# Patient Record
Sex: FEMALE | HISPANIC OR LATINO | Employment: FULL TIME | URBAN - METROPOLITAN AREA
[De-identification: names, ages, dates, MRNs, and addresses within clinical notes are randomized per-mention and may not be internally consistent; named-entity substitution may affect disease eponyms.]

---

## 2017-03-24 LAB
CHOLEST SERPL-MCNC: 203 MG/DL
HDLC SERPL-MCNC: 57 MG/DL (ref 35–60)
LDLC SERPL CALC-MCNC: 125 MG/DL

## 2018-07-05 LAB
BUN SERPL-MCNC: 17 MG/DL (ref 7–18)
CREAT SERPL-MCNC: 0.55 MG/DL (ref 0.6–1.3)
GLUCOSE SERPL-MCNC: 95 MG/DL (ref 74–106)

## 2018-08-01 ENCOUNTER — HISTORICAL (OUTPATIENT)
Dept: LAB | Facility: HOSPITAL | Age: 53
End: 2018-08-01

## 2018-08-01 LAB
ABS NEUT (OLG): 2.48 X10(3)/MCL (ref 2.1–9.2)
ALBUMIN SERPL-MCNC: 3.7 GM/DL (ref 3.4–5)
ALBUMIN/GLOB SERPL: 1.1 {RATIO}
ALP SERPL-CCNC: 75 UNIT/L (ref 38–126)
ALT SERPL-CCNC: 25 UNIT/L (ref 12–78)
APPEARANCE, UA: CLEAR
AST SERPL-CCNC: 21 UNIT/L (ref 15–37)
BACTERIA SPEC CULT: NORMAL /HPF
BASOPHILS # BLD AUTO: 0 X10(3)/MCL (ref 0–0.2)
BASOPHILS NFR BLD AUTO: 0 %
BILIRUB SERPL-MCNC: 0.4 MG/DL (ref 0.2–1)
BILIRUB UR QL STRIP: NEGATIVE
BILIRUBIN DIRECT+TOT PNL SERPL-MCNC: 0.1 MG/DL (ref 0–0.2)
BILIRUBIN DIRECT+TOT PNL SERPL-MCNC: 0.3 MG/DL (ref 0–0.8)
BUN SERPL-MCNC: 12 MG/DL (ref 7–18)
CALCIUM SERPL-MCNC: 8.2 MG/DL (ref 8.5–10.1)
CHLORIDE SERPL-SCNC: 109 MMOL/L (ref 98–107)
CHOLEST SERPL-MCNC: 203 MG/DL (ref 0–200)
CHOLEST/HDLC SERPL: 4.6 {RATIO} (ref 0–4)
CO2 SERPL-SCNC: 28 MMOL/L (ref 21–32)
COLOR UR: YELLOW
CREAT SERPL-MCNC: 0.65 MG/DL (ref 0.55–1.02)
CRP SERPL HS-MCNC: 0.91 MG/L (ref 0–3)
DEPRECATED CALCIDIOL+CALCIFEROL SERPL-MC: 23 NG/ML (ref 30–80)
EOSINOPHIL # BLD AUTO: 0.1 X10(3)/MCL (ref 0–0.9)
EOSINOPHIL NFR BLD AUTO: 2 %
ERYTHROCYTE [DISTWIDTH] IN BLOOD BY AUTOMATED COUNT: 13.3 % (ref 11.5–17)
ERYTHROCYTE [SEDIMENTATION RATE] IN BLOOD: 8 MM/HR (ref 0–20)
GLOBULIN SER-MCNC: 3.3 GM/DL (ref 2.4–3.5)
GLUCOSE (UA): NEGATIVE
GLUCOSE SERPL-MCNC: 88 MG/DL (ref 74–106)
HCT VFR BLD AUTO: 41.5 % (ref 37–47)
HDLC SERPL-MCNC: 44 MG/DL (ref 35–60)
HGB BLD-MCNC: 13.5 GM/DL (ref 12–16)
HGB UR QL STRIP: NEGATIVE
KETONES UR QL STRIP: NEGATIVE
LDLC SERPL CALC-MCNC: 107 MG/DL (ref 0–129)
LEUKOCYTE ESTERASE UR QL STRIP: NEGATIVE
LYMPHOCYTES # BLD AUTO: 2.4 X10(3)/MCL (ref 0.6–4.6)
LYMPHOCYTES NFR BLD AUTO: 45 %
MCH RBC QN AUTO: 28.3 PG (ref 27–31)
MCHC RBC AUTO-ENTMCNC: 32.5 GM/DL (ref 33–36)
MCV RBC AUTO: 87 FL (ref 80–94)
MONOCYTES # BLD AUTO: 0.3 X10(3)/MCL (ref 0.1–1.3)
MONOCYTES NFR BLD AUTO: 6 %
NEUTROPHILS # BLD AUTO: 2.48 X10(3)/MCL (ref 1.4–7.9)
NEUTROPHILS NFR BLD AUTO: 46 %
NITRITE UR QL STRIP: NEGATIVE
PH UR STRIP: 6.5 [PH] (ref 5–9)
PLATELET # BLD AUTO: 259 X10(3)/MCL (ref 130–400)
PMV BLD AUTO: 9 FL (ref 9.4–12.4)
POTASSIUM SERPL-SCNC: 4.1 MMOL/L (ref 3.5–5.1)
PROT SERPL-MCNC: 7 GM/DL (ref 6.4–8.2)
PROT UR QL STRIP: NEGATIVE
RBC # BLD AUTO: 4.77 X10(6)/MCL (ref 4.2–5.4)
RBC #/AREA URNS HPF: NORMAL /[HPF]
RHEUMATOID FACT SERPL-ACNC: <10 IU/ML (ref 0–15)
SODIUM SERPL-SCNC: 143 MMOL/L (ref 136–145)
SP GR UR STRIP: 1.02 (ref 1–1.03)
SQUAMOUS EPITHELIAL, UA: NORMAL
TRIGL SERPL-MCNC: 258 MG/DL (ref 30–150)
TSH SERPL-ACNC: 0.85 MIU/L (ref 0.36–3.74)
UROBILINOGEN UR STRIP-ACNC: 1
VIT B12 SERPL-MCNC: 347 PG/ML (ref 193–986)
VLDLC SERPL CALC-MCNC: 52 MG/DL
WBC # SPEC AUTO: 5.4 X10(3)/MCL (ref 4.5–11.5)
WBC #/AREA URNS HPF: NORMAL /[HPF]

## 2018-12-13 LAB
INFLUENZA A ANTIGEN, POC: NEGATIVE
INFLUENZA B ANTIGEN, POC: NEGATIVE
RAPID GROUP A STREP (OHS): NEGATIVE

## 2019-05-30 ENCOUNTER — HISTORICAL (OUTPATIENT)
Dept: LAB | Facility: HOSPITAL | Age: 54
End: 2019-05-30

## 2019-05-30 LAB
ABS NEUT (OLG): 2.22 X10(3)/MCL (ref 2.1–9.2)
ALBUMIN SERPL-MCNC: 3.5 GM/DL (ref 3.4–5)
ALBUMIN/GLOB SERPL: 1.1 RATIO (ref 1.1–2)
ALP SERPL-CCNC: 76 UNIT/L (ref 38–126)
ALT SERPL-CCNC: 25 UNIT/L (ref 12–78)
AST SERPL-CCNC: 16 UNIT/L (ref 15–37)
BASOPHILS # BLD AUTO: 0 X10(3)/MCL (ref 0–0.2)
BASOPHILS NFR BLD AUTO: 0 %
BILIRUB SERPL-MCNC: 0.3 MG/DL (ref 0.2–1)
BILIRUBIN DIRECT+TOT PNL SERPL-MCNC: 0.1 MG/DL (ref 0–0.5)
BILIRUBIN DIRECT+TOT PNL SERPL-MCNC: 0.2 MG/DL (ref 0–0.8)
BUN SERPL-MCNC: 12 MG/DL (ref 7–18)
CALCIUM SERPL-MCNC: 9 MG/DL (ref 8.5–10.1)
CHLORIDE SERPL-SCNC: 111 MMOL/L (ref 98–107)
CHOLEST SERPL-MCNC: 202 MG/DL (ref 0–200)
CHOLEST/HDLC SERPL: 3.5 {RATIO} (ref 0–4)
CO2 SERPL-SCNC: 28 MMOL/L (ref 21–32)
CREAT SERPL-MCNC: 0.63 MG/DL (ref 0.55–1.02)
DEPRECATED CALCIDIOL+CALCIFEROL SERPL-MC: 39.34 NG/ML (ref 30–80)
EOSINOPHIL # BLD AUTO: 0.1 X10(3)/MCL (ref 0–0.9)
EOSINOPHIL NFR BLD AUTO: 2 %
ERYTHROCYTE [DISTWIDTH] IN BLOOD BY AUTOMATED COUNT: 13.2 % (ref 11.5–17)
GLOBULIN SER-MCNC: 3.3 GM/DL (ref 2.4–3.5)
GLUCOSE SERPL-MCNC: 89 MG/DL (ref 74–106)
HCT VFR BLD AUTO: 41.1 % (ref 37–47)
HDLC SERPL-MCNC: 58 MG/DL (ref 35–60)
HGB BLD-MCNC: 13 GM/DL (ref 12–16)
LDLC SERPL CALC-MCNC: 123 MG/DL (ref 0–129)
LYMPHOCYTES # BLD AUTO: 2.2 X10(3)/MCL (ref 0.6–4.6)
LYMPHOCYTES NFR BLD AUTO: 47 %
MCH RBC QN AUTO: 27.7 PG (ref 27–31)
MCHC RBC AUTO-ENTMCNC: 31.6 GM/DL (ref 33–36)
MCV RBC AUTO: 87.6 FL (ref 80–94)
MONOCYTES # BLD AUTO: 0.2 X10(3)/MCL (ref 0.1–1.3)
MONOCYTES NFR BLD AUTO: 4 %
NEUTROPHILS # BLD AUTO: 2.22 X10(3)/MCL (ref 2.1–9.2)
NEUTROPHILS NFR BLD AUTO: 46 %
PLATELET # BLD AUTO: 244 X10(3)/MCL (ref 130–400)
PMV BLD AUTO: 9.4 FL (ref 9.4–12.4)
POTASSIUM SERPL-SCNC: 4.7 MMOL/L (ref 3.5–5.1)
PROT SERPL-MCNC: 6.8 GM/DL (ref 6.4–8.2)
RBC # BLD AUTO: 4.69 X10(6)/MCL (ref 4.2–5.4)
SODIUM SERPL-SCNC: 145 MMOL/L (ref 136–145)
TRIGL SERPL-MCNC: 103 MG/DL (ref 30–150)
TSH SERPL-ACNC: 1.08 MIU/L (ref 0.36–3.74)
VLDLC SERPL CALC-MCNC: 21 MG/DL
WBC # SPEC AUTO: 4.8 X10(3)/MCL (ref 4.5–11.5)

## 2020-02-04 ENCOUNTER — HISTORICAL (OUTPATIENT)
Dept: LAB | Facility: HOSPITAL | Age: 55
End: 2020-02-04

## 2020-02-04 LAB
ABS NEUT (OLG): 2.73 X10(3)/MCL (ref 2.1–9.2)
ALBUMIN SERPL-MCNC: 3.6 GM/DL (ref 3.4–5)
ALBUMIN/GLOB SERPL: 1.1 RATIO (ref 1.1–2)
ALP SERPL-CCNC: 63 UNIT/L (ref 38–126)
ALT SERPL-CCNC: 28 UNIT/L (ref 12–78)
AST SERPL-CCNC: 12 UNIT/L (ref 15–37)
BASOPHILS # BLD AUTO: 0 X10(3)/MCL (ref 0–0.2)
BASOPHILS NFR BLD AUTO: 0 %
BILIRUB SERPL-MCNC: 0.4 MG/DL (ref 0.2–1)
BILIRUBIN DIRECT+TOT PNL SERPL-MCNC: 0.1 MG/DL (ref 0–0.5)
BILIRUBIN DIRECT+TOT PNL SERPL-MCNC: 0.3 MG/DL (ref 0–0.8)
BUN SERPL-MCNC: 16 MG/DL (ref 7–18)
CALCIUM SERPL-MCNC: 8.3 MG/DL (ref 8.5–10.1)
CHLORIDE SERPL-SCNC: 110 MMOL/L (ref 98–107)
CHOLEST SERPL-MCNC: 198 MG/DL (ref 0–200)
CHOLEST/HDLC SERPL: 4 {RATIO} (ref 0–4)
CO2 SERPL-SCNC: 28 MMOL/L (ref 21–32)
CREAT SERPL-MCNC: 0.62 MG/DL (ref 0.55–1.02)
EOSINOPHIL # BLD AUTO: 0.1 X10(3)/MCL (ref 0–0.9)
EOSINOPHIL NFR BLD AUTO: 2 %
ERYTHROCYTE [DISTWIDTH] IN BLOOD BY AUTOMATED COUNT: 14.1 % (ref 11.5–17)
GLOBULIN SER-MCNC: 3.2 GM/DL (ref 2.4–3.5)
GLUCOSE SERPL-MCNC: 96 MG/DL (ref 74–106)
HCT VFR BLD AUTO: 44.2 % (ref 37–47)
HDLC SERPL-MCNC: 50 MG/DL (ref 35–60)
HGB BLD-MCNC: 13.5 GM/DL (ref 12–16)
LDLC SERPL CALC-MCNC: 127 MG/DL (ref 0–129)
LYMPHOCYTES # BLD AUTO: 2 X10(3)/MCL (ref 0.6–4.6)
LYMPHOCYTES NFR BLD AUTO: 39 %
MCH RBC QN AUTO: 26.7 PG (ref 27–31)
MCHC RBC AUTO-ENTMCNC: 30.5 GM/DL (ref 33–36)
MCV RBC AUTO: 87.5 FL (ref 80–94)
MONOCYTES # BLD AUTO: 0.2 X10(3)/MCL (ref 0.1–1.3)
MONOCYTES NFR BLD AUTO: 4 %
NEUTROPHILS # BLD AUTO: 2.73 X10(3)/MCL (ref 2.1–9.2)
NEUTROPHILS NFR BLD AUTO: 54 %
PLATELET # BLD AUTO: 248 X10(3)/MCL (ref 130–400)
PMV BLD AUTO: 9 FL (ref 9.4–12.4)
POTASSIUM SERPL-SCNC: 4.6 MMOL/L (ref 3.5–5.1)
PROT SERPL-MCNC: 6.8 GM/DL (ref 6.4–8.2)
RBC # BLD AUTO: 5.05 X10(6)/MCL (ref 4.2–5.4)
SODIUM SERPL-SCNC: 145 MMOL/L (ref 136–145)
TRIGL SERPL-MCNC: 103 MG/DL (ref 30–150)
TSH SERPL-ACNC: 0.72 MIU/L (ref 0.36–3.74)
VLDLC SERPL CALC-MCNC: 21 MG/DL
WBC # SPEC AUTO: 5 X10(3)/MCL (ref 4.5–11.5)

## 2022-04-09 ENCOUNTER — HISTORICAL (OUTPATIENT)
Dept: ADMINISTRATIVE | Facility: HOSPITAL | Age: 57
End: 2022-04-09

## 2022-04-27 VITALS
DIASTOLIC BLOOD PRESSURE: 79 MMHG | BODY MASS INDEX: 32.99 KG/M2 | HEIGHT: 65 IN | WEIGHT: 198 LBS | OXYGEN SATURATION: 97 % | SYSTOLIC BLOOD PRESSURE: 128 MMHG

## 2022-09-16 ENCOUNTER — HISTORICAL (OUTPATIENT)
Dept: ADMINISTRATIVE | Facility: HOSPITAL | Age: 57
End: 2022-09-16

## 2023-04-29 ENCOUNTER — OFFICE VISIT (OUTPATIENT)
Dept: URGENT CARE | Facility: CLINIC | Age: 58
End: 2023-04-29
Payer: COMMERCIAL

## 2023-04-29 VITALS
HEIGHT: 64 IN | TEMPERATURE: 99 F | SYSTOLIC BLOOD PRESSURE: 132 MMHG | RESPIRATION RATE: 16 BRPM | HEART RATE: 83 BPM | BODY MASS INDEX: 30.73 KG/M2 | WEIGHT: 180 LBS | DIASTOLIC BLOOD PRESSURE: 85 MMHG | OXYGEN SATURATION: 98 %

## 2023-04-29 DIAGNOSIS — R09.82 PND (POST-NASAL DRIP): ICD-10-CM

## 2023-04-29 DIAGNOSIS — J06.9 UPPER RESPIRATORY TRACT INFECTION, UNSPECIFIED TYPE: Primary | ICD-10-CM

## 2023-04-29 DIAGNOSIS — R05.9 COUGH, UNSPECIFIED TYPE: ICD-10-CM

## 2023-04-29 PROCEDURE — 96372 THER/PROPH/DIAG INJ SC/IM: CPT | Mod: ,,,

## 2023-04-29 PROCEDURE — 96372 PR INJECTION,THERAP/PROPH/DIAG2ST, IM OR SUBCUT: ICD-10-PCS | Mod: ,,,

## 2023-04-29 PROCEDURE — 99203 PR OFFICE/OUTPT VISIT, NEW, LEVL III, 30-44 MIN: ICD-10-PCS | Mod: 25,,,

## 2023-04-29 PROCEDURE — 99203 OFFICE O/P NEW LOW 30 MIN: CPT | Mod: 25,,,

## 2023-04-29 RX ORDER — LISINOPRIL 10 MG/1
10 TABLET ORAL
COMMUNITY
Start: 2023-04-24

## 2023-04-29 RX ORDER — PROGESTERONE 100 MG/1
CAPSULE ORAL
COMMUNITY

## 2023-04-29 RX ORDER — QUETIAPINE FUMARATE 25 MG/1
TABLET, FILM COATED ORAL
COMMUNITY

## 2023-04-29 RX ORDER — PROMETHAZINE HYDROCHLORIDE AND DEXTROMETHORPHAN HYDROBROMIDE 6.25; 15 MG/5ML; MG/5ML
5 SYRUP ORAL EVERY 6 HOURS PRN
Qty: 118 ML | Refills: 0 | Status: SHIPPED | OUTPATIENT
Start: 2023-04-29 | End: 2023-05-09

## 2023-04-29 RX ORDER — ERGOCALCIFEROL 1.25 MG/1
CAPSULE ORAL
COMMUNITY

## 2023-04-29 RX ORDER — DEXAMETHASONE SODIUM PHOSPHATE 100 MG/10ML
10 INJECTION INTRAMUSCULAR; INTRAVENOUS
Status: COMPLETED | OUTPATIENT
Start: 2023-04-29 | End: 2023-04-29

## 2023-04-29 RX ORDER — LISDEXAMFETAMINE DIMESYLATE 20 MG/1
20 CAPSULE ORAL
COMMUNITY
Start: 2023-03-30

## 2023-04-29 RX ORDER — ESTRADIOL 0.1 MG/D
1 FILM, EXTENDED RELEASE TRANSDERMAL
COMMUNITY

## 2023-04-29 RX ADMIN — DEXAMETHASONE SODIUM PHOSPHATE 10 MG: 100 INJECTION INTRAMUSCULAR; INTRAVENOUS at 02:04

## 2023-04-29 NOTE — PATIENT INSTRUCTIONS
Discussed possible viral URI, common cold etiology versus seasonal allergies, allergic rhinitis     Drink plenty of fluids. Get plenty of rest.   Claritin or Zyrtec 10 mg for nasal congestion.  Nasal spray such as Nasacort or Flonase for congestion.  Over-the-counter Coricidin if you develop sinus pressure.  Prescription Cough syrup nightly as it may make you drowsy.  Over-the-counter cough and cold medication for daytime cough.  Warm saltwater gargles for sore throat.  Warm water with honey to help coat the throat.  Throat lozenges.  Tylenol or ibuprofen as needed for sore throat and fever.   Chloraseptic Spray for worsening sore throat  Call or return to clinic as needed   Go to the ER with any significant change or worsening of symptoms.   Follow up with your primary care doctor.

## 2023-04-29 NOTE — PROGRESS NOTES
"Subjective:      Patient ID: Carly Macario is a 57 y.o. female.    Vitals:  height is 5' 4" (1.626 m) and weight is 81.6 kg (180 lb). Her temperature is 98.5 °F (36.9 °C). Her blood pressure is 132/85 and her pulse is 83. Her respiration is 16 and oxygen saturation is 98%.     Chief Complaint: Sinus Problem     Patient is a 57 y.o. female with past medical history of hypertension who presents to urgent care with complaints of nasal congestion, cough, and  post nasal drip which began yesterday.  No relief with tylenol and allergy medicines.  Patient denies fever, neck stiffness, rash, GI symptoms, shortness breath, chest pain.  Patient denies sinus pressure.  Discussed possibility of swabbing for COVID however patient reports she is not feel as if she has had COVID, she is had it before and symptoms were not similar.  Patient declines testing.  ROS   Objective:     Physical Exam   Constitutional: She is oriented to person, place, and time. She appears well-developed. She is cooperative.  Non-toxic appearance. She does not appear ill. No distress.   HENT:   Head: Normocephalic and atraumatic.   Ears:   Right Ear: Hearing, tympanic membrane, external ear and ear canal normal.   Left Ear: Hearing, tympanic membrane, external ear and ear canal normal.   Nose: Nose normal. No mucosal edema, rhinorrhea or nasal deformity. No epistaxis. Right sinus exhibits no maxillary sinus tenderness and no frontal sinus tenderness. Left sinus exhibits no maxillary sinus tenderness and no frontal sinus tenderness.   Mouth/Throat: Uvula is midline, oropharynx is clear and moist and mucous membranes are normal. No trismus in the jaw. Normal dentition. No uvula swelling. Cobblestoning present. No oropharyngeal exudate, posterior oropharyngeal edema or posterior oropharyngeal erythema.   Eyes: Conjunctivae and lids are normal. No scleral icterus.   Neck: Trachea normal and phonation normal. Neck supple. No edema present. No erythema present. " No neck rigidity present.   Cardiovascular: Normal rate, regular rhythm, normal heart sounds and normal pulses.   Pulmonary/Chest: Effort normal and breath sounds normal. No respiratory distress. She has no decreased breath sounds. She has no rhonchi.   Abdominal: Normal appearance.   Musculoskeletal: Normal range of motion.         General: No deformity. Normal range of motion.   Lymphadenopathy:     She has no cervical adenopathy.   Neurological: She is alert and oriented to person, place, and time. She exhibits normal muscle tone. Coordination normal.   Skin: Skin is warm, dry, intact, not diaphoretic and not pale.   Psychiatric: Her speech is normal and behavior is normal. Judgment and thought content normal.   Nursing note and vitals reviewed.    Assessment:     1. Upper respiratory tract infection, unspecified type    2. PND (post-nasal drip)    3. Cough, unspecified type        Plan:       Upper respiratory tract infection, unspecified type    PND (post-nasal drip)  -     dexAMETHasone injection 10 mg    Cough, unspecified type  -     promethazine-dextromethorphan (PROMETHAZINE-DM) 6.25-15 mg/5 mL Syrp; Take 5 mLs by mouth every 6 (six) hours as needed (cough).  Dispense: 118 mL; Refill: 0        Discussed possibility of steroid injection, informed patient that if symptoms could be related to COVID-19, steroid injection may lengthen symptoms, patient is requesting steroid injection and declining COVID testing.     Discussed possible viral URI, common cold etiology versus seasonal allergies, allergic rhinitis     Drink plenty of fluids. Get plenty of rest.   Claritin or Zyrtec 10 mg for nasal congestion.  Nasal spray such as Nasacort or Flonase for congestion.  Over-the-counter Coricidin if you develop sinus pressure.  Prescription Cough syrup nightly as it may make you drowsy.  Over-the-counter cough and cold medication for daytime cough.  Warm saltwater gargles for sore throat.  Warm water with honey to help  coat the throat.  Throat lozenges.  Tylenol or ibuprofen as needed for sore throat and fever.   Chloraseptic Spray for worsening sore throat  Call or return to clinic as needed   Go to the ER with any significant change or worsening of symptoms.   Follow up with your primary care doctor.

## 2023-07-28 ENCOUNTER — OFFICE VISIT (OUTPATIENT)
Dept: URGENT CARE | Facility: CLINIC | Age: 58
End: 2023-07-28
Payer: COMMERCIAL

## 2023-07-28 VITALS
HEART RATE: 93 BPM | DIASTOLIC BLOOD PRESSURE: 79 MMHG | HEIGHT: 64 IN | SYSTOLIC BLOOD PRESSURE: 117 MMHG | BODY MASS INDEX: 30.73 KG/M2 | TEMPERATURE: 98 F | OXYGEN SATURATION: 97 % | WEIGHT: 180 LBS | RESPIRATION RATE: 18 BRPM

## 2023-07-28 DIAGNOSIS — J32.9 SINUSITIS, UNSPECIFIED CHRONICITY, UNSPECIFIED LOCATION: Primary | ICD-10-CM

## 2023-07-28 PROCEDURE — 99213 OFFICE O/P EST LOW 20 MIN: CPT | Mod: S$PBB,25,, | Performed by: FAMILY MEDICINE

## 2023-07-28 PROCEDURE — 96372 PR INJECTION,THERAP/PROPH/DIAG2ST, IM OR SUBCUT: ICD-10-PCS | Mod: S$PBB,,, | Performed by: FAMILY MEDICINE

## 2023-07-28 PROCEDURE — 99213 PR OFFICE/OUTPT VISIT, EST, LEVL III, 20-29 MIN: ICD-10-PCS | Mod: S$PBB,25,, | Performed by: FAMILY MEDICINE

## 2023-07-28 PROCEDURE — 96372 THER/PROPH/DIAG INJ SC/IM: CPT | Mod: S$PBB,,, | Performed by: FAMILY MEDICINE

## 2023-07-28 RX ORDER — PREDNISONE 20 MG/1
40 TABLET ORAL DAILY
Qty: 8 TABLET | Refills: 0 | Status: SHIPPED | OUTPATIENT
Start: 2023-07-28 | End: 2023-08-01

## 2023-07-28 RX ORDER — DEXAMETHASONE SODIUM PHOSPHATE 100 MG/10ML
10 INJECTION INTRAMUSCULAR; INTRAVENOUS
Status: COMPLETED | OUTPATIENT
Start: 2023-07-28 | End: 2023-07-28

## 2023-07-28 RX ADMIN — DEXAMETHASONE SODIUM PHOSPHATE 10 MG: 100 INJECTION INTRAMUSCULAR; INTRAVENOUS at 09:07

## 2023-07-28 NOTE — PROGRESS NOTES
"Subjective:      Patient ID: Carly Macario is a 58 y.o. female.    Vitals:  height is 5' 4" (1.626 m) and weight is 81.6 kg (180 lb). Her temperature is 98.4 °F (36.9 °C). Her blood pressure is 117/79 and her pulse is 93. Her respiration is 18 and oxygen saturation is 97%.     Chief Complaint: Sinus Problem    58 y.o. female presents to clinic w/ c/o congestion, post nasal drip, bilateral ear fullness x2d. Declined covid and flu testing.  No over-the-counter medication taken.. Denies fever, neck stiffness, cough, sore throat, drainage from ears, ringing/popping, wheezing, SOB, CP, N/V/D, abdominal pain, sinus pressure, sinus headache.    Constitution: Negative.   HENT:  Positive for congestion and postnasal drip.    Cardiovascular: Negative.    Eyes: Negative.    Respiratory: Negative.     Gastrointestinal: Negative.    Genitourinary: Negative.    Musculoskeletal: Negative.    Skin: Negative.    Allergic/Immunologic: Negative.    Neurological: Negative.    Hematologic/Lymphatic: Negative.     Objective:     Physical Exam   Constitutional: She is oriented to person, place, and time. She appears well-developed. She is cooperative.  Non-toxic appearance. She does not appear ill. No distress.   HENT:   Head: Normocephalic and atraumatic.   Ears:   Right Ear: Hearing and external ear normal.   Left Ear: Hearing and external ear normal.   Mouth/Throat: Oropharynx is clear and moist and mucous membranes are normal. No oropharyngeal exudate or posterior oropharyngeal erythema (postnasal drip.  No sinus tenderness on palpation.).   Eyes: Conjunctivae and lids are normal.   Neck: Trachea normal and phonation normal. Neck supple. No edema present. No erythema present. No neck rigidity present.   Cardiovascular: Normal rate.   Pulmonary/Chest: Effort normal and breath sounds normal. No stridor. No respiratory distress. She has no decreased breath sounds. She has no wheezes. She has no rhonchi. She has no rales.   Abdominal: " "Normal appearance.   Lymphadenopathy:     She has no cervical adenopathy.   Neurological: She is alert and oriented to person, place, and time. She exhibits normal muscle tone. Coordination normal.   Skin: Skin is warm, dry, intact, not diaphoretic and no rash.   Psychiatric: Her speech is normal and behavior is normal. Mood, judgment and thought content normal.   Nursing note and vitals reviewed.       Previous History      Review of patient's allergies indicates:  No Known Allergies    Past Medical History:   Diagnosis Date    Anxiety     Hypertension      Current Outpatient Medications   Medication Instructions    ergocalciferol (ERGOCALCIFEROL) 50,000 unit Cap ergocalciferol (vitamin D2) 1,250 mcg (50,000 unit) capsule    estradioL (VIVELLE-DOT) 0.1 mg/24 hr PTSW 1 patch, Transdermal    lisinopriL 10 mg, Oral    predniSONE (DELTASONE) 40 mg, Oral, Daily    progesterone (PROMETRIUM) 100 MG capsule progesterone micronized 100 mg capsule   TAKE 1 CAPSULE BY MOUTH AT BEDTIME    QUEtiapine (SEROQUEL) 25 MG Tab quetiapine 25 mg tablet    VYVANSE 20 mg, Oral     History reviewed. No pertinent surgical history.  Family History   Family history unknown: Yes       Social History     Tobacco Use    Smoking status: Never    Smokeless tobacco: Never   Substance Use Topics    Alcohol use: Yes     Comment: 2 per mth        Physical Exam      Vital Signs Reviewed   /79   Pulse 93   Temp 98.4 °F (36.9 °C)   Resp 18   Ht 5' 4" (1.626 m)   Wt 81.6 kg (180 lb)   SpO2 97%   BMI 30.90 kg/m²        Procedures    Procedures     Labs     Results for orders placed or performed in visit on 09/16/22   BUN   Result Value Ref Range    Blood Urea Nitrogen 17 7 - 18 mg/dL   Creatinine, Serum   Result Value Ref Range    Creatinine 0.55 (L) 0.6 - 1.3 mg/dL   Glucose, Random   Result Value Ref Range    Glucose Level 95 74 - 106 mg/dL   HDL Cholesterol   Result Value Ref Range    HDL Cholesterol 57 35 - 60 mg/dL   LDL Cholesterol, " Direct   Result Value Ref Range    LDL Cholesterol 125 <130 mg/dL   Cholesterol, Total   Result Value Ref Range    Cholesterol Total 203 (H) <200 mg/dL       Assessment:     1. Sinusitis, unspecified chronicity, unspecified location        Plan:   Medications sent to pharmacy  Start steroids tomorrow morning  Start taking an allergy pill daily such as claritin, zyrtec, allegrea or xyzal. Also start using a nasal steroid spray such as flonase or nasacort daily.If oral steroids were prescribed, start them tomorrow morning. Monitor for fever. Take tylenol/acetaminophen or ibuprofen as needed. Rest and hydrate. If symptoms persist or worsen, return to clinic or seek medical attention immediately.       Sinusitis, unspecified chronicity, unspecified location    Other orders  -     dexAMETHasone injection 10 mg  -     predniSONE (DELTASONE) 20 MG tablet; Take 2 tablets (40 mg total) by mouth once daily. for 4 days  Dispense: 8 tablet; Refill: 0

## 2023-07-28 NOTE — PATIENT INSTRUCTIONS
Medications sent to pharmacy  Start steroids tomorrow morning  Start taking an allergy pill daily such as claritin, zyrtec, allegrea or xyzal. Also start using a nasal steroid spray such as flonase or nasacort daily.If oral steroids were prescribed, start them tomorrow morning. Monitor for fever. Take tylenol/acetaminophen or ibuprofen as needed. Rest and hydrate. If symptoms persist or worsen, return to clinic or seek medical attention immediately.

## 2024-09-18 PROBLEM — E66.9 OBESITY: Status: ACTIVE | Noted: 2024-09-18

## 2024-09-18 PROBLEM — Z79.890 HORMONE REPLACEMENT THERAPY: Status: ACTIVE | Noted: 2024-09-18

## 2024-09-18 PROBLEM — F90.9 ATTENTION DEFICIT DISORDER OF ADULT WITH HYPERACTIVITY: Status: ACTIVE | Noted: 2024-09-18

## 2024-09-18 PROBLEM — M19.90 OSTEOARTHRITIS: Status: ACTIVE | Noted: 2024-09-18

## 2024-09-18 PROBLEM — I10 HYPERTENSION: Status: ACTIVE | Noted: 2024-09-18

## 2024-09-19 ENCOUNTER — OFFICE VISIT (OUTPATIENT)
Dept: PRIMARY CARE CLINIC | Facility: CLINIC | Age: 59
End: 2024-09-19
Payer: COMMERCIAL

## 2024-09-19 VITALS
TEMPERATURE: 98 F | WEIGHT: 193 LBS | HEIGHT: 65 IN | HEART RATE: 89 BPM | OXYGEN SATURATION: 99 % | DIASTOLIC BLOOD PRESSURE: 91 MMHG | SYSTOLIC BLOOD PRESSURE: 138 MMHG | RESPIRATION RATE: 18 BRPM | BODY MASS INDEX: 32.15 KG/M2

## 2024-09-19 DIAGNOSIS — Z11.4 ENCOUNTER FOR SCREENING FOR HIV: ICD-10-CM

## 2024-09-19 DIAGNOSIS — Z00.00 WELLNESS EXAMINATION: ICD-10-CM

## 2024-09-19 DIAGNOSIS — F41.1 GAD (GENERALIZED ANXIETY DISORDER): ICD-10-CM

## 2024-09-19 DIAGNOSIS — Z12.4 CERVICAL CANCER SCREENING: ICD-10-CM

## 2024-09-19 DIAGNOSIS — I10 PRIMARY HYPERTENSION: ICD-10-CM

## 2024-09-19 DIAGNOSIS — Z11.59 ENCOUNTER FOR HEPATITIS C SCREENING TEST FOR LOW RISK PATIENT: ICD-10-CM

## 2024-09-19 DIAGNOSIS — Z79.890 HORMONE REPLACEMENT THERAPY: ICD-10-CM

## 2024-09-19 DIAGNOSIS — Z76.89 ENCOUNTER TO ESTABLISH CARE WITH NEW DOCTOR: Primary | ICD-10-CM

## 2024-09-19 DIAGNOSIS — G47.00 INSOMNIA, UNSPECIFIED TYPE: ICD-10-CM

## 2024-09-19 DIAGNOSIS — Z12.11 COLON CANCER SCREENING: ICD-10-CM

## 2024-09-19 DIAGNOSIS — E53.8 VITAMIN B12 DEFICIENCY: ICD-10-CM

## 2024-09-19 DIAGNOSIS — F90.9 ATTENTION DEFICIT DISORDER OF ADULT WITH HYPERACTIVITY: ICD-10-CM

## 2024-09-19 DIAGNOSIS — E55.9 VITAMIN D DEFICIENCY: ICD-10-CM

## 2024-09-19 DIAGNOSIS — Z12.31 ENCOUNTER FOR SCREENING MAMMOGRAM FOR BREAST CANCER: ICD-10-CM

## 2024-09-19 RX ORDER — ESTRADIOL 0.05 MG/D
1 PATCH, EXTENDED RELEASE TRANSDERMAL
COMMUNITY
Start: 2024-08-06 | End: 2024-09-19 | Stop reason: SDUPTHER

## 2024-09-19 RX ORDER — DEXTROAMPHETAMINE SACCHARATE, AMPHETAMINE ASPARTATE MONOHYDRATE, DEXTROAMPHETAMINE SULFATE AND AMPHETAMINE SULFATE 7.5; 7.5; 7.5; 7.5 MG/1; MG/1; MG/1; MG/1
CAPSULE, EXTENDED RELEASE ORAL
COMMUNITY
Start: 2024-07-06 | End: 2024-09-19

## 2024-09-19 RX ORDER — ESTRADIOL 0.05 MG/D
1 PATCH, EXTENDED RELEASE TRANSDERMAL
Qty: 8 PATCH | Refills: 3 | Status: SHIPPED | OUTPATIENT
Start: 2024-09-19

## 2024-09-19 RX ORDER — FLUOXETINE HYDROCHLORIDE 20 MG/1
20 CAPSULE ORAL DAILY
Qty: 30 CAPSULE | Refills: 11 | Status: SHIPPED | OUTPATIENT
Start: 2024-09-19 | End: 2025-09-19

## 2024-09-19 RX ORDER — QUETIAPINE FUMARATE 25 MG/1
25 TABLET, FILM COATED ORAL NIGHTLY
Qty: 30 TABLET | Refills: 11 | Status: SHIPPED | OUTPATIENT
Start: 2024-09-19 | End: 2025-09-19

## 2024-09-19 RX ORDER — DEXMETHYLPHENIDATE HYDROCHLORIDE 30 MG/1
30 CAPSULE, EXTENDED RELEASE ORAL DAILY
Qty: 30 CAPSULE | Refills: 0 | Status: SHIPPED | OUTPATIENT
Start: 2024-09-19

## 2024-09-20 ENCOUNTER — LAB VISIT (OUTPATIENT)
Dept: LAB | Facility: HOSPITAL | Age: 59
End: 2024-09-20
Attending: STUDENT IN AN ORGANIZED HEALTH CARE EDUCATION/TRAINING PROGRAM
Payer: COMMERCIAL

## 2024-09-20 DIAGNOSIS — F90.9 ATTENTION DEFICIT DISORDER OF ADULT WITH HYPERACTIVITY: ICD-10-CM

## 2024-09-20 DIAGNOSIS — Z11.59 ENCOUNTER FOR HEPATITIS C SCREENING TEST FOR LOW RISK PATIENT: ICD-10-CM

## 2024-09-20 DIAGNOSIS — Z00.00 WELLNESS EXAMINATION: ICD-10-CM

## 2024-09-20 DIAGNOSIS — E55.9 VITAMIN D DEFICIENCY: ICD-10-CM

## 2024-09-20 DIAGNOSIS — Z76.89 ENCOUNTER TO ESTABLISH CARE WITH NEW DOCTOR: ICD-10-CM

## 2024-09-20 DIAGNOSIS — Z11.4 ENCOUNTER FOR SCREENING FOR HIV: ICD-10-CM

## 2024-09-20 DIAGNOSIS — E53.8 VITAMIN B12 DEFICIENCY: ICD-10-CM

## 2024-09-20 DIAGNOSIS — I10 PRIMARY HYPERTENSION: ICD-10-CM

## 2024-09-20 DIAGNOSIS — G47.00 INSOMNIA, UNSPECIFIED TYPE: ICD-10-CM

## 2024-09-20 DIAGNOSIS — F41.1 GAD (GENERALIZED ANXIETY DISORDER): ICD-10-CM

## 2024-09-20 DIAGNOSIS — Z79.890 HORMONE REPLACEMENT THERAPY: ICD-10-CM

## 2024-09-20 LAB
25(OH)D3+25(OH)D2 SERPL-MCNC: 21 NG/ML (ref 30–80)
ALBUMIN SERPL-MCNC: 3.7 G/DL (ref 3.5–5)
ALBUMIN/GLOB SERPL: 1.4 RATIO (ref 1.1–2)
ALP SERPL-CCNC: 63 UNIT/L (ref 40–150)
ALT SERPL-CCNC: 18 UNIT/L (ref 0–55)
ANION GAP SERPL CALC-SCNC: 6 MEQ/L
AST SERPL-CCNC: 15 UNIT/L (ref 5–34)
BACTERIA #/AREA URNS AUTO: ABNORMAL /HPF
BASOPHILS # BLD AUTO: 0.01 X10(3)/MCL
BASOPHILS NFR BLD AUTO: 0.2 %
BILIRUB SERPL-MCNC: 0.5 MG/DL
BILIRUB UR QL STRIP.AUTO: NEGATIVE
BUN SERPL-MCNC: 13.1 MG/DL (ref 9.8–20.1)
CALCIUM SERPL-MCNC: 9.6 MG/DL (ref 8.4–10.2)
CHLORIDE SERPL-SCNC: 108 MMOL/L (ref 98–107)
CHOLEST SERPL-MCNC: 180 MG/DL
CHOLEST/HDLC SERPL: 4 {RATIO} (ref 0–5)
CLARITY UR: CLEAR
CO2 SERPL-SCNC: 27 MMOL/L (ref 22–29)
COLOR UR AUTO: ABNORMAL
CREAT SERPL-MCNC: 0.78 MG/DL (ref 0.55–1.02)
CREAT/UREA NIT SERPL: 17
EOSINOPHIL # BLD AUTO: 0.06 X10(3)/MCL (ref 0–0.9)
EOSINOPHIL NFR BLD AUTO: 1.3 %
ERYTHROCYTE [DISTWIDTH] IN BLOOD BY AUTOMATED COUNT: 14.1 % (ref 11.5–17)
EST. AVERAGE GLUCOSE BLD GHB EST-MCNC: 108.3 MG/DL
ESTRADIOL SERPL HS-MCNC: 28 PG/ML
FSH SERPL-ACNC: 28.77 MIU/ML
GFR SERPLBLD CREATININE-BSD FMLA CKD-EPI: >60 ML/MIN/1.73/M2
GLOBULIN SER-MCNC: 2.6 GM/DL (ref 2.4–3.5)
GLUCOSE SERPL-MCNC: 97 MG/DL (ref 74–100)
GLUCOSE UR QL STRIP: NORMAL
HBA1C MFR BLD: 5.4 %
HCT VFR BLD AUTO: 42.4 % (ref 37–47)
HDLC SERPL-MCNC: 43 MG/DL (ref 35–60)
HGB BLD-MCNC: 13.6 G/DL (ref 12–16)
HGB UR QL STRIP: NEGATIVE
IMM GRANULOCYTES # BLD AUTO: 0.02 X10(3)/MCL (ref 0–0.04)
IMM GRANULOCYTES NFR BLD AUTO: 0.4 %
KETONES UR QL STRIP: NEGATIVE
LDLC SERPL CALC-MCNC: 106 MG/DL (ref 50–140)
LEUKOCYTE ESTERASE UR QL STRIP: NEGATIVE
LH SERPL-ACNC: 14.69 MIU/ML
LYMPHOCYTES # BLD AUTO: 1.87 X10(3)/MCL (ref 0.6–4.6)
LYMPHOCYTES NFR BLD AUTO: 40.4 %
MCH RBC QN AUTO: 27.9 PG (ref 27–31)
MCHC RBC AUTO-ENTMCNC: 32.1 G/DL (ref 33–36)
MCV RBC AUTO: 87.1 FL (ref 80–94)
MONOCYTES # BLD AUTO: 0.29 X10(3)/MCL (ref 0.1–1.3)
MONOCYTES NFR BLD AUTO: 6.3 %
MUCOUS THREADS URNS QL MICRO: ABNORMAL /LPF
NEUTROPHILS # BLD AUTO: 2.38 X10(3)/MCL (ref 2.1–9.2)
NEUTROPHILS NFR BLD AUTO: 51.4 %
NITRITE UR QL STRIP: NEGATIVE
NRBC BLD AUTO-RTO: 0 %
PH UR STRIP: 7 [PH]
PLATELET # BLD AUTO: 265 X10(3)/MCL (ref 130–400)
PMV BLD AUTO: 9 FL (ref 7.4–10.4)
POTASSIUM SERPL-SCNC: 5 MMOL/L (ref 3.5–5.1)
PROGEST SERPL-MCNC: <0.5 NG/ML
PROT SERPL-MCNC: 6.3 GM/DL (ref 6.4–8.3)
PROT UR QL STRIP: NEGATIVE
RBC # BLD AUTO: 4.87 X10(6)/MCL (ref 4.2–5.4)
RBC #/AREA URNS AUTO: ABNORMAL /HPF
SODIUM SERPL-SCNC: 141 MMOL/L (ref 136–145)
SP GR UR STRIP.AUTO: 1.02 (ref 1–1.03)
SQUAMOUS #/AREA URNS LPF: ABNORMAL /HPF
TESTOST SERPL-MCNC: 35.26 NG/DL (ref 12.4–35.75)
TRIGL SERPL-MCNC: 156 MG/DL (ref 37–140)
TSH SERPL-ACNC: 0.79 UIU/ML (ref 0.35–4.94)
UROBILINOGEN UR STRIP-ACNC: NORMAL
VIT B12 SERPL-MCNC: 1099 PG/ML (ref 213–816)
VLDLC SERPL CALC-MCNC: 31 MG/DL
WBC # BLD AUTO: 4.63 X10(3)/MCL (ref 4.5–11.5)
WBC #/AREA URNS AUTO: ABNORMAL /HPF

## 2024-09-20 PROCEDURE — 36415 COLL VENOUS BLD VENIPUNCTURE: CPT

## 2024-09-20 PROCEDURE — 80053 COMPREHEN METABOLIC PANEL: CPT

## 2024-09-20 PROCEDURE — 82306 VITAMIN D 25 HYDROXY: CPT

## 2024-09-20 PROCEDURE — 81001 URINALYSIS AUTO W/SCOPE: CPT

## 2024-09-20 PROCEDURE — 83001 ASSAY OF GONADOTROPIN (FSH): CPT

## 2024-09-20 PROCEDURE — 85025 COMPLETE CBC W/AUTO DIFF WBC: CPT

## 2024-09-20 PROCEDURE — 84443 ASSAY THYROID STIM HORMONE: CPT

## 2024-09-20 PROCEDURE — 84403 ASSAY OF TOTAL TESTOSTERONE: CPT

## 2024-09-20 PROCEDURE — 82670 ASSAY OF TOTAL ESTRADIOL: CPT

## 2024-09-20 PROCEDURE — 82607 VITAMIN B-12: CPT

## 2024-09-20 PROCEDURE — 80061 LIPID PANEL: CPT

## 2024-09-20 PROCEDURE — 86803 HEPATITIS C AB TEST: CPT

## 2024-09-20 PROCEDURE — 83036 HEMOGLOBIN GLYCOSYLATED A1C: CPT

## 2024-09-20 PROCEDURE — 84144 ASSAY OF PROGESTERONE: CPT

## 2024-09-20 PROCEDURE — 81015 MICROSCOPIC EXAM OF URINE: CPT

## 2024-09-20 PROCEDURE — 83002 ASSAY OF GONADOTROPIN (LH): CPT

## 2024-09-20 PROCEDURE — 87389 HIV-1 AG W/HIV-1&-2 AB AG IA: CPT

## 2024-09-20 NOTE — ASSESSMENT & PLAN NOTE
Stable   Continue supplementation   Encouraged diet with high B12 foods   Repeat at upcoming wellness visit

## 2024-09-20 NOTE — ASSESSMENT & PLAN NOTE
Refilled, explained to patient that I don't manage HRT therapy, will refer to OBGYN  Obtain hormonal panel to assist

## 2024-09-20 NOTE — ASSESSMENT & PLAN NOTE
Stable   Continue Seroquel nightly as needed for insomnia  Encouraged good sleep hygiene (limit caffeine to earlier in shift, avoid naps, blackout curtains, blue lights, limit phone/tv exposure etc)

## 2024-09-20 NOTE — ASSESSMENT & PLAN NOTE
Today's BP slightly elevated  Continue Lisinopril  Counseled on low sodium diet, exercise, tobacco avoidance, and limiting alcohol intake   Pt. Has home BP cuff, instructed to measure home BP and report abnormal values   Bring Logs, and monitor - patient states that she has mild white coat HTN, has been checking her BP at home and systolic in the 110s and diasystolic in the 70s

## 2024-09-20 NOTE — ASSESSMENT & PLAN NOTE
Worsening   Start Fluoxetine   Encouraged self coping mechanisms (deep breathing, exercise, yoga, meditation, etc)

## 2024-09-20 NOTE — ASSESSMENT & PLAN NOTE
Patient meets DSM V diagnostic criteria for ADD/ADHD. Symptoms present in childhood (<12 years of age) and have persisted into adulthood.   Symptoms are affecting her performance at work and affecting her home life. She wishes to purse treatment. Benefits vs Risks discussed at length.   No depression, anxiety disorder, other psychiatric illness, substance abuse, cardiac history, current cardiac symptoms.  Will start focalin 30mg XR  Pt instructed to notify clinic if symptoms not controlled after 2-4 week trial. Will titrate dose based on symptoms, side effects, and tolerability.   Will maintain maximum of 3 month follow up intervals in the setting of controlled substance prescription. Refills will not be provided if appointments are missed. Pt. Will be screened for psychiatric illness and substance abuse at each visit.   Pt. Expresses understanding and agrees with plan.

## 2024-09-20 NOTE — PROGRESS NOTES
Subjective:       Patient ID: Carly Macario is a 59 y.o. female.    -------------------------------------    Anxiety    Hypertension      Chief Complaint: Establish Care    Presents to the clinic to establish care. Due for wellness visit. Need referral to GI for colonoscopy. Due for mammogram. Endorsed worsening anxiety, started during Covid, but never really improved. Need medication refill. Has ADD, tried both adderall and Vyvanse without much success. Will try something else.       Review of Systems   Constitutional:  Negative for chills and fever.   Respiratory:  Negative for cough.    Cardiovascular:  Negative for chest pain.   Gastrointestinal:  Negative for abdominal pain and vomiting.   Genitourinary:  Negative for hot flashes.   Psychiatric/Behavioral:  Positive for decreased concentration. Negative for sleep disturbance. The patient is nervous/anxious.          Objective:      Physical Exam  Vitals and nursing note reviewed.   Constitutional:       General: She is not in acute distress.     Appearance: Normal appearance. She is not ill-appearing.   HENT:      Head: Normocephalic.      Nose: Nose normal. No rhinorrhea.      Mouth/Throat:      Mouth: Mucous membranes are moist.   Eyes:      General: No scleral icterus.     Conjunctiva/sclera: Conjunctivae normal.   Cardiovascular:      Rate and Rhythm: Normal rate and regular rhythm.   Pulmonary:      Effort: Pulmonary effort is normal. No respiratory distress.   Musculoskeletal:         General: No deformity.   Skin:     General: Skin is warm and dry.      Coloration: Skin is not jaundiced.   Neurological:      Mental Status: She is alert and oriented to person, place, and time. Mental status is at baseline.      Cranial Nerves: No cranial nerve deficit.      Gait: Gait normal.   Psychiatric:         Mood and Affect: Mood normal.         Behavior: Behavior normal.         Assessment & Plan:   1. Encounter to establish care with new  doctor  Comments:  Reviewed medical history and reconciled medications   Ordered Wellness Labs   Requested records from previous provider/specialists  Orders:  -     CBC Auto Differential; Future; Expected date: 09/19/2024  -     Comprehensive Metabolic Panel; Future; Expected date: 09/19/2024  -     TSH; Future; Expected date: 09/19/2024  -     Lipid Panel; Future; Expected date: 09/19/2024  -     Urinalysis; Future; Expected date: 09/19/2024  -     Hemoglobin A1C; Future; Expected date: 09/19/2024  -     Vitamin D; Future; Expected date: 09/19/2024  -     Vitamin B12; Future; Expected date: 09/19/2024    2. Primary hypertension  Assessment & Plan:  Today's BP slightly elevated  Continue Lisinopril  Counseled on low sodium diet, exercise, tobacco avoidance, and limiting alcohol intake   Pt. Has home BP cuff, instructed to measure home BP and report abnormal values   Bring Logs, and monitor - patient states that she has mild white coat HTN, has been checking her BP at home and systolic in the 110s and diasystolic in the 70s      Orders:  -     CBC Auto Differential; Future; Expected date: 09/19/2024  -     Comprehensive Metabolic Panel; Future; Expected date: 09/19/2024  -     TSH; Future; Expected date: 09/19/2024  -     Lipid Panel; Future; Expected date: 09/19/2024  -     Urinalysis; Future; Expected date: 09/19/2024  -     Hemoglobin A1C; Future; Expected date: 09/19/2024    3. Attention deficit disorder of adult with hyperactivity  Overview:  Vyvanse - didn't work   Adderall XR - didn't work    Assessment & Plan:  Patient meets DSM V diagnostic criteria for ADD/ADHD. Symptoms present in childhood (<12 years of age) and have persisted into adulthood.   Symptoms are affecting her performance at work and affecting her home life. She wishes to purse treatment. Benefits vs Risks discussed at length.   No depression, anxiety disorder, other psychiatric illness, substance abuse, cardiac history, current cardiac  symptoms.  Will start focalin 30mg XR  Pt instructed to notify clinic if symptoms not controlled after 2-4 week trial. Will titrate dose based on symptoms, side effects, and tolerability.   Will maintain maximum of 3 month follow up intervals in the setting of controlled substance prescription. Refills will not be provided if appointments are missed. Pt. Will be screened for psychiatric illness and substance abuse at each visit.   Pt. Expresses understanding and agrees with plan.       Orders:  -     dexmethylphenidate (FOCALIN XR) 30 mg 24 hr capsule; Take 30 mg by mouth once daily.  Dispense: 30 capsule; Refill: 0  -     CBC Auto Differential; Future; Expected date: 09/19/2024  -     Comprehensive Metabolic Panel; Future; Expected date: 09/19/2024    4. Hormone replacement therapy  Assessment & Plan:  Refilled, explained to patient that I don't manage HRT therapy, will refer to OBGYN  Obtain hormonal panel to assist       Orders:  -     AGUEDA 0.05 mg/24 hr; Place 1 patch onto the skin twice a week.  Dispense: 8 patch; Refill: 3  -     Estradiol; Future; Expected date: 09/19/2024  -     Luteinizing Hormone; Future; Expected date: 09/19/2024  -     Progesterone; Future; Expected date: 09/19/2024  -     Follicle Stimulating Hormone; Future; Expected date: 09/19/2024  -     Testosterone; Future; Expected date: 09/19/2024    5. Vitamin D deficiency  Assessment & Plan:  Stable, continue supplementation   Repeat Level at Wellness Visit   Encouraged moderate sun exposure, increase PO calcium intake      Orders:  -     Vitamin D; Future; Expected date: 09/19/2024    6. Vitamin B12 deficiency  Assessment & Plan:  Stable   Continue supplementation   Encouraged diet with high B12 foods   Repeat at upcoming wellness visit       Orders:  -     Vitamin B12; Future; Expected date: 09/19/2024    7. KRIS (generalized anxiety disorder)  Assessment & Plan:  Worsening   Start Fluoxetine   Encouraged self coping mechanisms (deep  breathing, exercise, yoga, meditation, etc)       Orders:  -     FLUoxetine 20 MG capsule; Take 1 capsule (20 mg total) by mouth once daily.  Dispense: 30 capsule; Refill: 11  -     QUEtiapine (SEROQUEL) 25 MG Tab; Take 1 tablet (25 mg total) by mouth every evening.  Dispense: 30 tablet; Refill: 11  -     CBC Auto Differential; Future; Expected date: 09/19/2024  -     Comprehensive Metabolic Panel; Future; Expected date: 09/19/2024  -     TSH; Future; Expected date: 09/19/2024  -     Lipid Panel; Future; Expected date: 09/19/2024  -     Urinalysis; Future; Expected date: 09/19/2024    8. Insomnia, unspecified type  Assessment & Plan:  Stable   Continue Seroquel nightly as needed for insomnia  Encouraged good sleep hygiene (limit caffeine to earlier in shift, avoid naps, blackout curtains, blue lights, limit phone/tv exposure etc)       Orders:  -     QUEtiapine (SEROQUEL) 25 MG Tab; Take 1 tablet (25 mg total) by mouth every evening.  Dispense: 30 tablet; Refill: 11  -     CBC Auto Differential; Future; Expected date: 09/19/2024  -     Comprehensive Metabolic Panel; Future; Expected date: 09/19/2024  -     TSH; Future; Expected date: 09/19/2024  -     Lipid Panel; Future; Expected date: 09/19/2024  -     Urinalysis; Future; Expected date: 09/19/2024    9. Wellness examination  -     CBC Auto Differential; Future; Expected date: 09/19/2024  -     Comprehensive Metabolic Panel; Future; Expected date: 09/19/2024  -     TSH; Future; Expected date: 09/19/2024  -     Lipid Panel; Future; Expected date: 09/19/2024  -     Urinalysis; Future; Expected date: 09/19/2024  -     Hemoglobin A1C; Future; Expected date: 09/19/2024  -     Vitamin D; Future; Expected date: 09/19/2024  -     Vitamin B12; Future; Expected date: 09/19/2024    10. Encounter for screening for HIV  -     HIV 1/2 Ag/Ab (4th Gen); Future; Expected date: 09/19/2024    11. Encounter for hepatitis C screening test for low risk patient  -     Hepatitis C  Antibody; Future; Expected date: 09/19/2024    12. Cervical cancer screening  -     Ambulatory referral/consult to Obstetrics / Gynecology; Future; Expected date: 09/26/2024    13. Colon cancer screening  -     Ambulatory referral/consult to Gastroenterology; Future; Expected date: 09/26/2024    14. Encounter for screening mammogram for breast cancer  -     Mammo Digital Screening Bilat w/ Travis; Future; Expected date: 09/19/2024      Follow up in about 4 weeks (around 10/17/2024) for Wellness, Anxiety, Lab Results. In addition to their scheduled follow up, the patient has also been instructed to follow up on as needed basis.

## 2024-09-21 LAB
HCV AB SERPL QL IA: NONREACTIVE
HIV 1+2 AB+HIV1 P24 AG SERPL QL IA: NONREACTIVE

## 2024-10-10 ENCOUNTER — HOSPITAL ENCOUNTER (OUTPATIENT)
Dept: RADIOLOGY | Facility: HOSPITAL | Age: 59
Discharge: HOME | End: 2024-10-10
Attending: STUDENT IN AN ORGANIZED HEALTH CARE EDUCATION/TRAINING PROGRAM
Payer: COMMERCIAL

## 2024-10-10 DIAGNOSIS — Z12.31 ENCOUNTER FOR SCREENING MAMMOGRAM FOR BREAST CANCER: ICD-10-CM

## 2024-10-10 PROCEDURE — 77067 SCR MAMMO BI INCL CAD: CPT | Mod: TC

## 2024-10-25 ENCOUNTER — OFFICE VISIT (OUTPATIENT)
Dept: PRIMARY CARE CLINIC | Facility: CLINIC | Age: 59
End: 2024-10-25
Payer: COMMERCIAL

## 2024-10-25 VITALS
HEIGHT: 65 IN | RESPIRATION RATE: 18 BRPM | DIASTOLIC BLOOD PRESSURE: 84 MMHG | OXYGEN SATURATION: 98 % | TEMPERATURE: 98 F | BODY MASS INDEX: 33.62 KG/M2 | HEART RATE: 84 BPM | WEIGHT: 201.81 LBS | SYSTOLIC BLOOD PRESSURE: 135 MMHG

## 2024-10-25 DIAGNOSIS — Z82.49 FAMILY HISTORY OF EARLY CAD: ICD-10-CM

## 2024-10-25 DIAGNOSIS — E55.9 VITAMIN D DEFICIENCY: Chronic | ICD-10-CM

## 2024-10-25 DIAGNOSIS — E78.1 HYPERTRIGLYCERIDEMIA: ICD-10-CM

## 2024-10-25 DIAGNOSIS — Z13.6 ENCOUNTER FOR SCREENING FOR CARDIOVASCULAR DISORDERS: ICD-10-CM

## 2024-10-25 DIAGNOSIS — E53.8 VITAMIN B12 DEFICIENCY: Chronic | ICD-10-CM

## 2024-10-25 DIAGNOSIS — F41.1 GAD (GENERALIZED ANXIETY DISORDER): Chronic | ICD-10-CM

## 2024-10-25 DIAGNOSIS — Z00.00 WELLNESS EXAMINATION: Primary | ICD-10-CM

## 2024-10-25 DIAGNOSIS — F90.9 ATTENTION DEFICIT DISORDER OF ADULT WITH HYPERACTIVITY: Chronic | ICD-10-CM

## 2024-10-25 PROCEDURE — 1160F RVW MEDS BY RX/DR IN RCRD: CPT | Mod: CPTII,,, | Performed by: STUDENT IN AN ORGANIZED HEALTH CARE EDUCATION/TRAINING PROGRAM

## 2024-10-25 PROCEDURE — 3044F HG A1C LEVEL LT 7.0%: CPT | Mod: CPTII,,, | Performed by: STUDENT IN AN ORGANIZED HEALTH CARE EDUCATION/TRAINING PROGRAM

## 2024-10-25 PROCEDURE — 99396 PREV VISIT EST AGE 40-64: CPT | Mod: ,,, | Performed by: STUDENT IN AN ORGANIZED HEALTH CARE EDUCATION/TRAINING PROGRAM

## 2024-10-25 PROCEDURE — 3075F SYST BP GE 130 - 139MM HG: CPT | Mod: CPTII,,, | Performed by: STUDENT IN AN ORGANIZED HEALTH CARE EDUCATION/TRAINING PROGRAM

## 2024-10-25 PROCEDURE — 4010F ACE/ARB THERAPY RXD/TAKEN: CPT | Mod: CPTII,,, | Performed by: STUDENT IN AN ORGANIZED HEALTH CARE EDUCATION/TRAINING PROGRAM

## 2024-10-25 PROCEDURE — 3008F BODY MASS INDEX DOCD: CPT | Mod: CPTII,,, | Performed by: STUDENT IN AN ORGANIZED HEALTH CARE EDUCATION/TRAINING PROGRAM

## 2024-10-25 PROCEDURE — 1159F MED LIST DOCD IN RCRD: CPT | Mod: CPTII,,, | Performed by: STUDENT IN AN ORGANIZED HEALTH CARE EDUCATION/TRAINING PROGRAM

## 2024-10-25 PROCEDURE — 3079F DIAST BP 80-89 MM HG: CPT | Mod: CPTII,,, | Performed by: STUDENT IN AN ORGANIZED HEALTH CARE EDUCATION/TRAINING PROGRAM

## 2024-10-25 RX ORDER — ERGOCALCIFEROL 1.25 MG/1
50000 CAPSULE ORAL
Qty: 12 CAPSULE | Refills: 3 | Status: SHIPPED | OUTPATIENT
Start: 2024-10-25 | End: 2025-10-25

## 2024-10-25 NOTE — PROGRESS NOTES
ANNUAL WELLNESS NOTE    Chief Complaint:  Results     HPI:  Carly Macario is a 59 y.o. female    -------------------------------------    Anxiety    Hypertension     Patient Care Team:  Whit Lombardo MD as PCP - General (Internal Medicine)    Presents today for wellness visit. Describes overall health as good. Diet is not healthy - work in progress. Exercises rarely. Tobacco use: never. Alcohol use: rare (special occasion). Caffeine intake: 2-3 caffeinated drinks daily. Eye exam: annually (wears glasses). Dental exam: overdue. Reviewed labs, answered all questions and concerns at this time. Stopped both fluoxetine and Focalin due to side effects.     Review of Systems   Constitutional:  Negative for chills and fever.   Respiratory:  Negative for cough.    Cardiovascular:  Negative for chest pain.   Gastrointestinal:  Negative for abdominal pain and vomiting.   Genitourinary:  Negative for dysuria and hematuria.   Psychiatric/Behavioral:  The patient is not nervous/anxious.      Physical Exam  Vitals and nursing note reviewed.   Constitutional:       General: She is not in acute distress.     Appearance: Normal appearance. She is not ill-appearing.   HENT:      Head: Normocephalic.      Nose: Nose normal. No rhinorrhea.      Mouth/Throat:      Mouth: Mucous membranes are moist.   Eyes:      General: No scleral icterus.     Conjunctiva/sclera: Conjunctivae normal.   Cardiovascular:      Rate and Rhythm: Normal rate and regular rhythm.   Pulmonary:      Effort: Pulmonary effort is normal. No respiratory distress.   Musculoskeletal:         General: No deformity.   Skin:     General: Skin is warm and dry.      Coloration: Skin is not jaundiced.   Neurological:      Mental Status: She is alert and oriented to person, place, and time. Mental status is at baseline.      Cranial Nerves: No cranial nerve deficit.      Gait: Gait normal.   Psychiatric:         Mood and Affect: Mood normal.         Behavior: Behavior  normal.       Assessment/Plan:  1. Wellness examination  Assessment & Plan:  Health Maintenance:  Routine Health Maintenance   Exercise as tolerated, >30 minutes a day for 3-5 days a week.  Counseled patient on compliance with medications and healthy diet.     Age-Appropriate Screening  Vaccinations:    - Pneumonia: N/A   - Shingles (>50): Postponed, patient's preference    - Tdap: UTD, due 2032   - COVID:  2 dose series completed     Screening:   - Pap Smear (21-65): Hysterectomy   - Mammogram (40-50 discuss w/ pt, all >50): UTD, scheduled to November   - Osteoporosis (all>65, sooner if risk factors): N/A   - Lung Ca. Screening (50-80 if >20 pack yrs current or quit <15 yrs): N/A   - Colon Ca. Screening (age >45): UTD, requested records   - Hep. C, HIV: Negative       2. Attention deficit disorder of adult with hyperactivity  Overview:  Vyvanse - didn't work   Adderall XR - didn't work  Focalin - depressed    Assessment & Plan:  Patient meets DSM V diagnostic criteria for ADD/ADHD. Symptoms present in childhood (<12 years of age) and have persisted into adulthood.   Symptoms are affecting her performance at work and affecting her home life. She wishes to purse treatment. Benefits vs Risks discussed at length.   No depression, anxiety disorder, other psychiatric illness, substance abuse, cardiac history, current cardiac symptoms.  Stopped Focalin, would like to hold off on any further medication at this time  Pt instructed to notify clinic if symptoms not controlled after 2-4 week trial. Will titrate dose based on symptoms, side effects, and tolerability.   Will maintain maximum of 3 month follow up intervals in the setting of controlled substance prescription. Refills will not be provided if appointments are missed. Pt. Will be screened for psychiatric illness and substance abuse at each visit.   Pt. Expresses understanding and agrees with plan.         3. KRIS (generalized anxiety disorder)  Overview:  Fluoxetine -  nausea    Assessment & Plan:  Stable   Stopped Fluoxetine due to side effects   Will hold off on further medications at this time, work on behavioral modifications  Encouraged self coping mechanisms (deep breathing, exercise, yoga, meditation, etc)         4. Hypertriglyceridemia  -     CT Calcium Scoring Cardiac; Future; Expected date: 10/25/2024  -     Lipid Panel; Future; Expected date: 04/26/2025    5. Family history of early CAD  -     CT Calcium Scoring Cardiac; Future; Expected date: 10/25/2024  -     Lipid Panel; Future; Expected date: 04/26/2025    6. Encounter for screening for cardiovascular disorders  -     CT Calcium Scoring Cardiac; Future; Expected date: 10/25/2024    7. Vitamin D deficiency  Assessment & Plan:  Low   Restart 50,000U Ergocalciferol weekly   Repeat Level at Wellness Visit   Encouraged moderate sun exposure, increase PO calcium intake      Orders:  -     ergocalciferol (ERGOCALCIFEROL) 50,000 unit Cap; Take 1 capsule (50,000 Units total) by mouth every 7 days.  Dispense: 12 capsule; Refill: 3  -     Vitamin D; Future; Expected date: 04/26/2025    8. Vitamin B12 deficiency  Assessment & Plan:  Stable  No need for supplementation at this time  Encouraged diet with high B12 foods   Repeat at upcoming wellness visit     Follow up in about 6 months (around 4/25/2025) for HLD, Vitamin D, Lab Results.

## 2024-10-26 PROBLEM — Z00.00 WELLNESS EXAMINATION: Chronic | Status: ACTIVE | Noted: 2024-10-26

## 2024-10-26 PROBLEM — Z00.00 WELLNESS EXAMINATION: Status: ACTIVE | Noted: 2024-10-26

## 2024-10-27 NOTE — ASSESSMENT & PLAN NOTE
Health Maintenance:  Routine Health Maintenance   Exercise as tolerated, >30 minutes a day for 3-5 days a week.  Counseled patient on compliance with medications and healthy diet.     Age-Appropriate Screening  Vaccinations:    - Pneumonia: N/A   - Shingles (>50): Postponed, patient's preference    - Tdap: UTD, due 2032   - COVID:  2 dose series completed     Screening:   - Pap Smear (21-65): Hysterectomy   - Mammogram (40-50 discuss w/ pt, all >50): UTD, scheduled to November   - Osteoporosis (all>65, sooner if risk factors): N/A   - Lung Ca. Screening (50-80 if >20 pack yrs current or quit <15 yrs): N/A   - Colon Ca. Screening (age >45): UTD, requested records   - Hep. C, HIV: Negative

## 2024-10-27 NOTE — ASSESSMENT & PLAN NOTE
Low   Restart 50,000U Ergocalciferol weekly   Repeat Level at Wellness Visit   Encouraged moderate sun exposure, increase PO calcium intake

## 2024-10-27 NOTE — ASSESSMENT & PLAN NOTE
Stable   Stopped Fluoxetine due to side effects   Will hold off on further medications at this time, work on behavioral modifications  Encouraged self coping mechanisms (deep breathing, exercise, yoga, meditation, etc)

## 2024-10-27 NOTE — ASSESSMENT & PLAN NOTE
Stable  No need for supplementation at this time  Encouraged diet with high B12 foods   Repeat at upcoming wellness visit

## 2024-11-07 ENCOUNTER — HOSPITAL ENCOUNTER (OUTPATIENT)
Dept: RADIOLOGY | Facility: HOSPITAL | Age: 59
Discharge: HOME | End: 2024-11-07
Attending: STUDENT IN AN ORGANIZED HEALTH CARE EDUCATION/TRAINING PROGRAM
Payer: COMMERCIAL

## 2024-11-07 VITALS — BODY MASS INDEX: 32.44 KG/M2 | WEIGHT: 190 LBS | HEIGHT: 64 IN

## 2024-11-07 DIAGNOSIS — R92.8 ABNORMAL MAMMOGRAM: ICD-10-CM

## 2024-11-07 PROCEDURE — 77061 BREAST TOMOSYNTHESIS UNI: CPT | Mod: 26,LT,, | Performed by: RADIOLOGY

## 2024-11-07 PROCEDURE — 77061 BREAST TOMOSYNTHESIS UNI: CPT | Mod: TC,LT

## 2024-11-07 PROCEDURE — 76641 ULTRASOUND BREAST COMPLETE: CPT | Mod: TC,LT

## 2024-11-07 PROCEDURE — 77065 DX MAMMO INCL CAD UNI: CPT | Mod: 26,LT,, | Performed by: RADIOLOGY

## 2024-11-19 ENCOUNTER — DOCUMENTATION ONLY (OUTPATIENT)
Dept: PRIMARY CARE CLINIC | Facility: CLINIC | Age: 59
End: 2024-11-19
Payer: COMMERCIAL

## 2024-11-19 LAB — CRC RECOMMENDATION EXT: NORMAL

## 2024-11-20 DIAGNOSIS — A63.0 CONDYLOMA: Primary | ICD-10-CM

## 2024-12-12 ENCOUNTER — OFFICE VISIT (OUTPATIENT)
Dept: SURGICAL ONCOLOGY | Facility: CLINIC | Age: 59
End: 2024-12-12
Payer: COMMERCIAL

## 2024-12-12 VITALS
SYSTOLIC BLOOD PRESSURE: 127 MMHG | WEIGHT: 206.19 LBS | HEIGHT: 64 IN | BODY MASS INDEX: 35.2 KG/M2 | OXYGEN SATURATION: 96 % | HEART RATE: 84 BPM | DIASTOLIC BLOOD PRESSURE: 91 MMHG

## 2024-12-12 DIAGNOSIS — A63.0 ANAL CONDYLOMA: ICD-10-CM

## 2024-12-12 PROCEDURE — 3080F DIAST BP >= 90 MM HG: CPT | Mod: CPTII,S$GLB,,

## 2024-12-12 PROCEDURE — 3074F SYST BP LT 130 MM HG: CPT | Mod: CPTII,S$GLB,,

## 2024-12-12 PROCEDURE — 1159F MED LIST DOCD IN RCRD: CPT | Mod: CPTII,S$GLB,,

## 2024-12-12 PROCEDURE — 99204 OFFICE O/P NEW MOD 45 MIN: CPT | Mod: S$GLB,,,

## 2024-12-12 PROCEDURE — 1160F RVW MEDS BY RX/DR IN RCRD: CPT | Mod: CPTII,S$GLB,,

## 2024-12-12 PROCEDURE — 99999 PR PBB SHADOW E&M-EST. PATIENT-LVL III: CPT | Mod: PBBFAC,,,

## 2024-12-12 PROCEDURE — 4010F ACE/ARB THERAPY RXD/TAKEN: CPT | Mod: CPTII,S$GLB,,

## 2024-12-12 PROCEDURE — 3044F HG A1C LEVEL LT 7.0%: CPT | Mod: CPTII,S$GLB,,

## 2024-12-12 PROCEDURE — 3008F BODY MASS INDEX DOCD: CPT | Mod: CPTII,S$GLB,,

## 2024-12-12 RX ORDER — MULTIVITAMIN WITH IRON
TABLET ORAL DAILY
COMMUNITY

## 2024-12-12 RX ORDER — VITAMIN B COMPLEX
1 CAPSULE ORAL DAILY
COMMUNITY

## 2024-12-12 RX ORDER — CYST/ALA/Q10/PHOS.SER/DHA/BROC 100-20-50
POWDER (GRAM) ORAL
COMMUNITY

## 2024-12-12 NOTE — PROGRESS NOTES
Colorectal Surgery  Patient ID: [id number]     Chief Complaint: Condyloma Eval      HPI:     Carly Macario is a 59 y.o. female here today for an initial consultation referred by Dr. Britt for anal condyloma.  She had a colonoscopy done on 24 which revealed a perianal condyloma. She reports having a history of hemorrhoids with hemorrhoidectomy which made her believe the growth was another hemorrhoid. She reports mild bleeding when she wipes the growth.     Past Medical History:  has a past medical history of Anxiety and Hypertension.    Surgical History:  has a past surgical history that includes  section (1984); Hysterectomy (); and Tubal ligation ().    Family History: family history includes Arthritis in her mother; Diabetes in her father and mother; Heart disease in her brother and father; Hypertension in her mother.    Social History:  reports that she quit smoking about 20 years ago. Her smoking use included cigarettes. She started smoking about 38 years ago. She has a 18 pack-year smoking history. She has never used smokeless tobacco. She reports that she does not currently use alcohol. She reports that she does not use drugs.    Current Outpatient Medications   Medication Instructions    nkag-etbx-Wlox-banab-mar-thean (CORTISOLV) [id number] mg Cap Take by mouth.    b complex vitamins capsule 1 capsule, Daily    AGUEDA 0.05 mg/24 hr 1 patch, Transdermal, Twice weekly    ergocalciferol (ERGOCALCIFEROL) 50,000 Units, Oral, Every 7 days    lisinopriL 10 mg    omega-3 fatty acids/fish oil (FISH OIL-OMEGA-3 FATTY ACIDS) 300-1,000 mg capsule Daily    progesterone (PROMETRIUM) 100 MG capsule progesterone micronized 100 mg capsule   TAKE 1 CAPSULE BY MOUTH AT BEDTIME    QUEtiapine (SEROQUEL) 25 mg, Oral, Nightly       Patient has No Known Allergies.     Patient Care Team:  Whit Lombardo MD as PCP - General (Internal Medicine)     Subjective:     Review of Systems    12 point review  "of systems conducted, negative except as stated in the history of present illness. See HPI for details.    Objective:     Visit Vitals  BP (!) 127/91 (BP Location: Right arm, Patient Position: Sitting)   Pulse 84   Ht 5' 4" (1.626 m)   Wt 93.5 kg (206 lb 3.2 oz)   SpO2 96%   BMI 35.39 kg/m²       Physical Exam    General: Alert and oriented, No acute distress.  Head: Normocephalic, Atraumatic.  Eye: Pupils are equal and round, Extraocular movements are intact, Sclera non-icteric.  Ears/Nose/Throat: Normal, Mucosa moist, Clear.  Respiratory: No wheezes, Non-labored respirations, Symmetrical chest wall expansion.  Cardiovascular: Regular rate.  Gastrointestinal: Soft, Non-tender, Non-distended, Normal bowel sounds, No palpable masses.  Rectal: Anterior anal condyloma  Integumentary: Warm, Dry, Intact, No rashes.  Extremities: No edema.  Neurologic: No focal deficits.  Psychiatric: Normal interaction, Coherent speech, Euthymic mood, Appropriate affect.     Labs Reviewed:     Chemistry:  Lab Results   Component Value Date     09/20/2024    K 5.0 09/20/2024    BUN 13.1 09/20/2024    CREATININE 0.78 09/20/2024    EGFRNORACEVR >60 09/20/2024    GLUCOSE 97 09/20/2024    CALCIUM 9.6 09/20/2024    ALKPHOS 63 09/20/2024    LABPROT 6.3 (L) 09/20/2024    ALBUMIN 3.7 09/20/2024    BILIDIR 0.10 02/04/2020    IBILI 0.30 02/04/2020    AST 15 09/20/2024    ALT 18 09/20/2024        Hematology:  Lab Results   Component Value Date    WBC 4.63 09/20/2024    HGB 13.6 09/20/2024    HCT 42.4 09/20/2024     09/20/2024       Assessment:       ICD-10-CM ICD-9-CM   1. Anal condyloma  A63.0 078.11        Plan:     Anal Condyloma  - Schedule condyloma excision for 1/17/25  - Explained the procedure in detail with risks and benefits. All questions answered at this time.       No follow-ups on file. In addition to their scheduled follow up, the patient has also been instructed to follow up on as needed basis.     Future Appointments "   Date Time Provider Department Center   1/2/2025  9:30 AM Rut Chiu FNP [facility]   4/25/2025  8:00 AM Whit Lombardo MD [facility]

## 2024-12-26 RX ORDER — LISINOPRIL 10 MG/1
10 TABLET ORAL DAILY
Qty: 90 TABLET | Refills: 1 | Status: SHIPPED | OUTPATIENT
Start: 2024-12-26

## 2024-12-26 NOTE — TELEPHONE ENCOUNTER
----- Message from [employer] sent at 12/26/2024 10:01 AM CST -----  .Who Called: Carly Macario    Refill or New Rx:Refill  RX Name and Strength:lisinopriL 10 MG tablet   How is the patient currently taking it? (ex. 1XDay):Sig - Route: Take 10 mg by mouth. - Oral  Is this a 30 day or 90 day RX:n/a  Local or Mail Order:Local  List of preferred pharmacies on file (remove unneeded): [facility] - [place], [place] - [address]  Ordering Provider:Gogo Weathers, NP    Preferred Method of Contact: Phone Call  Patient's Preferred Phone Number on File: [phone]   Best Call Back Number, if different:  Additional Information: Pt is requesting a refill for the following. Please call to advise

## 2024-12-26 NOTE — TELEPHONE ENCOUNTER
----- Message from [employer] sent at 12/26/2024 10:01 AM CST -----  .Who Called: Carly Macario    Refill or New Rx:Refill  RX Name and Strength:lisinopriL 10 MG tablet   How is the patient currently taking it? (ex. 1XDay):Sig - Route: Take 10 mg by mouth. - Oral  Is this a 30 day or 90 day RX:n/a  Local or Mail Order:Local  List of preferred pharmacies on file (remove unneeded): [facility] - [place], [place] - [address]  Ordering Provider:Gogo Weathers, NP    Preferred Method of Contact: Phone Call  Patient's Preferred Phone Number on File: [phone]   Best Call Back Number, if different:  Additional Information: Pt is requesting a refill for the following. Please call to advise

## 2024-12-27 ENCOUNTER — OFFICE VISIT (OUTPATIENT)
Dept: URGENT CARE | Facility: CLINIC | Age: 59
End: 2024-12-27
Payer: COMMERCIAL

## 2024-12-27 VITALS
TEMPERATURE: 99 F | DIASTOLIC BLOOD PRESSURE: 99 MMHG | HEIGHT: 65 IN | BODY MASS INDEX: 33.32 KG/M2 | WEIGHT: 200 LBS | OXYGEN SATURATION: 96 % | RESPIRATION RATE: 18 BRPM | HEART RATE: 90 BPM | SYSTOLIC BLOOD PRESSURE: 148 MMHG

## 2024-12-27 DIAGNOSIS — J32.9 SINUSITIS, UNSPECIFIED CHRONICITY, UNSPECIFIED LOCATION: Primary | ICD-10-CM

## 2024-12-27 RX ORDER — DEXAMETHASONE SODIUM PHOSPHATE 10 MG/ML
10 INJECTION INTRAMUSCULAR; INTRAVENOUS
Status: COMPLETED | OUTPATIENT
Start: 2024-12-27 | End: 2024-12-27

## 2024-12-27 RX ORDER — PREDNISONE 10 MG/1
30 TABLET ORAL DAILY
Qty: 9 TABLET | Refills: 0 | Status: SHIPPED | OUTPATIENT
Start: 2024-12-27 | End: 2024-12-30

## 2024-12-27 RX ORDER — DOXYCYCLINE 100 MG/1
100 CAPSULE ORAL 2 TIMES DAILY
Qty: 14 CAPSULE | Refills: 0 | Status: SHIPPED | OUTPATIENT
Start: 2024-12-27 | End: 2025-01-03

## 2024-12-27 RX ADMIN — DEXAMETHASONE SODIUM PHOSPHATE 10 MG: 10 INJECTION INTRAMUSCULAR; INTRAVENOUS at 01:12

## 2024-12-27 NOTE — PATIENT INSTRUCTIONS
Plan:   Medications sent to pharmacy  Start oral steroids tomorrow  Hold antibiotics for 3-4 days and start if symptoms persist  Stop the off brand nasal decongestant spray  Start taking an allergy pill daily such as claritin, zyrtec, allegrea or xyzal. Also start using a nasal steroid spray such as flonase or nasacort daily. If you are not being treated for high blood pressure, you can also take decongestant such as sudafed as needed. They can be purchased over the counter. If oral steroids were prescribed, start them tomorrow morning. Monitor for fever. Take tylenol/acetaminophen or ibuprofen as needed. Rest and hydrate. If symptoms persist or worsen, return to clinic or seek medical attention immediately.

## 2024-12-27 NOTE — PROGRESS NOTES
"Subjective:      Patient ID: Carly Macario is a 59 y.o. female.    Vitals:  height is 5' 4.5" (1.638 m) and weight is 90.7 kg (200 lb). Her temperature is 98.5 °F (36.9 °C). Her blood pressure is 148/99 (abnormal) and her pulse is 90. Her respiration is 18 and oxygen saturation is 96%.     Chief Complaint: Nasal Congestion (Sinus headache and stuffy and runny nose. - Entered by patient)     Patient is a 59 y.o. female who presents to urgent care with complaints of congestion, runny nose, headache with sinus pressure since yesterday. Patient denies fever shortness of breath vomiting or diarrhea.  Has been using a nasal decongestant spray        Constitution: Negative.   HENT:  Positive for congestion and sinus pressure.    Cardiovascular: Negative.    Eyes: Negative.    Respiratory: Negative.     Gastrointestinal: Negative.    Genitourinary: Negative.    Musculoskeletal: Negative.    Skin: Negative.    Allergic/Immunologic: Negative.    Neurological:  Positive for headaches.   Hematologic/Lymphatic: Negative.       Objective:     Physical Exam   Constitutional: She is oriented to person, place, and time. She appears well-developed. She is cooperative.  Non-toxic appearance. She does not appear ill. No distress.   HENT:   Head: Normocephalic and atraumatic.   Ears:   Right Ear: Hearing, tympanic membrane and external ear normal.   Left Ear: Hearing, tympanic membrane and external ear normal.   Mouth/Throat: Mucous membranes are normal. Posterior oropharyngeal erythema (postnasal drip) present. No oropharyngeal exudate.   Eyes: Conjunctivae and lids are normal.   Neck: Trachea normal and phonation normal. Neck supple. No edema present. No erythema present. No neck rigidity present.   Cardiovascular: Normal rate.   Pulmonary/Chest: Effort normal and breath sounds normal. No stridor. No respiratory distress. She has no decreased breath sounds. She has no wheezes. She has no rhonchi. She has no rales.   Abdominal: Normal " appearance.   Neurological: She is alert and oriented to person, place, and time. She exhibits normal muscle tone. Coordination normal.   Skin: Skin is warm, dry, intact, not diaphoretic and no rash.   Psychiatric: Her speech is normal and behavior is normal. Mood, judgment and thought content normal.   Nursing note and vitals reviewed.         Previous History      Review of patient's allergies indicates:  No Known Allergies    Past Medical History:   Diagnosis Date    Anxiety     Hypertension      Current Outpatient Medications   Medication Instructions    aldr-jrxa-Dpse-banab-mar-thean (CORTISOLV) [phone] mg Cap Take by mouth.    b complex vitamins capsule 1 capsule, Daily    AGUEDA 0.05 mg/24 hr 1 patch, Transdermal, Twice weekly    doxycycline (MONODOX) 100 mg, Oral, 2 times daily    ergocalciferol (ERGOCALCIFEROL) 50,000 Units, Oral, Every 7 days    lisinopriL 10 mg, Oral, Daily    omega-3 fatty acids/fish oil (FISH OIL-OMEGA-3 FATTY ACIDS) 300-1,000 mg capsule Daily    predniSONE (DELTASONE) 30 mg, Oral, Daily    progesterone (PROMETRIUM) 100 MG capsule progesterone micronized 100 mg capsule   TAKE 1 CAPSULE BY MOUTH AT BEDTIME    QUEtiapine (SEROQUEL) 25 mg, Oral, Nightly     Past Surgical History:   Procedure Laterality Date     SECTION  1984    HYSTERECTOMY  2002    Partial    TUBAL LIGATION       Family History   Problem Relation Name Age of Onset    Arthritis Mother Judie Ross     Diabetes Mother Judie Ross     Hypertension Mother Judie Ross     Diabetes Father Christiano Ross     Heart disease Father Christiano Ross     Heart disease Brother Itz Ross     Breast cancer Neg Hx         Social History     Tobacco Use    Smoking status: Former     Current packs/day: 0.00     Average packs/day: 1 pack/day for 18.0 years (18.0 ttl pk-yrs)     Types: Cigarettes     Start date: 1986     Quit date: 2004     Years since quittin.0    Smokeless  "tobacco: Never    Tobacco comments:     Off and on during time frame   Substance Use Topics    Alcohol use: Not Currently     Comment: I dont normally drink. I do have an occasional glass of win    Drug use: Never        Physical Exam      Vital Signs Reviewed   BP (!) 148/99   Pulse 90   Temp 98.5 °F (36.9 °C)   Resp 18   Ht 5' 4.5" (1.638 m)   Wt 90.7 kg (200 lb)   SpO2 96%   BMI 33.80 kg/m²        Procedures    Procedures     Labs     Results for orders placed or performed in visit on 11/19/24    COLONOSCOPY    Collection Time: 11/19/24 12:00 AM   Result Value Ref Range    CRC Recommendation External Repeat colonoscopy in 5 years        Assessment:     1. Sinusitis, unspecified chronicity, unspecified location        Plan:   Medications sent to pharmacy  Start oral steroids tomorrow  Hold antibiotics for 3-4 days and start if symptoms persist  Stop the off brand nasal decongestant spray  Start taking an allergy pill daily such as claritin, zyrtec, allegrea or xyzal. Also start using a nasal steroid spray such as flonase or nasacort daily. If you are not being treated for high blood pressure, you can also take decongestant such as sudafed as needed. They can be purchased over the counter. If oral steroids were prescribed, start them tomorrow morning. Monitor for fever. Take tylenol/acetaminophen or ibuprofen as needed. Rest and hydrate. If symptoms persist or worsen, return to clinic or seek medical attention immediately.       Sinusitis, unspecified chronicity, unspecified location    Other orders  -     doxycycline (MONODOX) 100 MG capsule; Take 1 capsule (100 mg total) by mouth 2 (two) times daily. for 7 days  Dispense: 14 capsule; Refill: 0  -     dexAMETHasone injection 10 mg  -     predniSONE (DELTASONE) 10 MG tablet; Take 3 tablets (30 mg total) by mouth once daily. for 3 days  Dispense: 9 tablet; Refill: 0                    "

## 2025-01-02 ENCOUNTER — OFFICE VISIT (OUTPATIENT)
Dept: SURGICAL ONCOLOGY | Facility: CLINIC | Age: 60
End: 2025-01-02
Payer: COMMERCIAL

## 2025-01-02 VITALS
SYSTOLIC BLOOD PRESSURE: 124 MMHG | HEIGHT: 64 IN | WEIGHT: 208 LBS | DIASTOLIC BLOOD PRESSURE: 85 MMHG | BODY MASS INDEX: 35.51 KG/M2 | HEART RATE: 90 BPM

## 2025-01-02 DIAGNOSIS — A63.0 ANAL CONDYLOMA: Primary | ICD-10-CM

## 2025-01-02 PROCEDURE — 3079F DIAST BP 80-89 MM HG: CPT | Mod: CPTII,S$GLB,,

## 2025-01-02 PROCEDURE — 1160F RVW MEDS BY RX/DR IN RCRD: CPT | Mod: CPTII,S$GLB,,

## 2025-01-02 PROCEDURE — 1159F MED LIST DOCD IN RCRD: CPT | Mod: CPTII,S$GLB,,

## 2025-01-02 PROCEDURE — 3074F SYST BP LT 130 MM HG: CPT | Mod: CPTII,S$GLB,,

## 2025-01-02 PROCEDURE — 3008F BODY MASS INDEX DOCD: CPT | Mod: CPTII,S$GLB,,

## 2025-01-02 PROCEDURE — 99999 PR PBB SHADOW E&M-EST. PATIENT-LVL III: CPT | Mod: PBBFAC,,,

## 2025-01-02 PROCEDURE — 99213 OFFICE O/P EST LOW 20 MIN: CPT | Mod: S$GLB,,,

## 2025-01-02 NOTE — PROGRESS NOTES
"   Colorectal Surgery  Patient ID: [id number]     Chief Complaint: Pre-op Exam ( pre op: excison of anal condyloma 1/17/2025/)      HPI:     Carly Macario is a 59 y.o. female here today for pre op appointment for anal condyloma for 1/17/25. She denies any pain, bleeding, or drainage from rectum.     Current Outpatient Medications   Medication Instructions    nkhu-laqz-Cebq-banab-mar-thean (CORTISOLV) [id number] mg Cap Take by mouth.    b complex vitamins capsule 1 capsule, Daily    AGUEDA 0.05 mg/24 hr 1 patch, Transdermal, Twice weekly    doxycycline (MONODOX) 100 mg, Oral, 2 times daily    ergocalciferol (ERGOCALCIFEROL) 50,000 Units, Oral, Every 7 days    lisinopriL 10 mg, Oral, Daily    omega-3 fatty acids/fish oil (FISH OIL-OMEGA-3 FATTY ACIDS) 300-1,000 mg capsule Daily    progesterone (PROMETRIUM) 100 MG capsule progesterone micronized 100 mg capsule   TAKE 1 CAPSULE BY MOUTH AT BEDTIME    QUEtiapine (SEROQUEL) 25 mg, Oral, Nightly       Patient has No Known Allergies.     Patient Care Team:  Whit Lombardo MD as PCP - General (Internal Medicine)     Subjective:     Review of Systems    12 point review of systems conducted, negative except as stated in the history of present illness. See HPI for details.    Objective:     Visit Vitals  /85 (BP Location: Left arm, Patient Position: Sitting)   Pulse 90   Ht 5' 4" (1.626 m)   Wt 94.3 kg (208 lb)   SpO2 (P) 98%   BMI 35.70 kg/m²       Physical Exam    General: Alert and oriented, No acute distress.  Head: Normocephalic, Atraumatic.  Eye: Sclera non-icteric.  Respiratory: Non-labored respirations, Symmetrical chest wall expansion.  Cardiovascular: Regular rate.  Gastrointestinal: Soft, Non-tender, Non-distended, Normal bowel sounds.  Integumentary: Warm, Dry, Intact, No rashes.  Extremities: No edema.  Neurologic: No focal deficits.    Labs Reviewed:     Chemistry:  Lab Results   Component Value Date     09/20/2024    K 5.0 09/20/2024    BUN " 13.1 09/20/2024    CREATININE 0.78 09/20/2024    EGFRNORACEVR >60 09/20/2024    GLUCOSE 97 09/20/2024    CALCIUM 9.6 09/20/2024    ALKPHOS 63 09/20/2024    LABPROT 6.3 (L) 09/20/2024    ALBUMIN 3.7 09/20/2024    BILIDIR 0.10 02/04/2020    IBILI 0.30 02/04/2020    AST 15 09/20/2024    ALT 18 09/20/2024        Hematology:  Lab Results   Component Value Date    WBC 4.63 09/20/2024    HGB 13.6 09/20/2024    HCT 42.4 09/20/2024     09/20/2024       Assessment:       ICD-10-CM ICD-9-CM   1. Anal condyloma  A63.0 078.11        Plan:       1. Anal condyloma      - Schedule condyloma excision for 1/17/25  - Explained the procedure in detail with risks and benefits. All questions answered at this time.     No follow-ups on file. In addition to their scheduled follow up, the patient has also been instructed to follow up on as needed basis.

## 2025-01-02 NOTE — H&P (VIEW-ONLY)
"   Colorectal Surgery  Patient ID: [id number]     Chief Complaint: Pre-op Exam ( pre op: excison of anal condyloma 1/17/2025/)      HPI:     Carly Macario is a 59 y.o. female here today for pre op appointment for anal condyloma for 1/17/25. She denies any pain, bleeding, or drainage from rectum.     Current Outpatient Medications   Medication Instructions    hrrc-uzfs-Ojvt-banab-mar-thean (CORTISOLV) [phone] mg Cap Take by mouth.    b complex vitamins capsule 1 capsule, Daily    AGUEDA 0.05 mg/24 hr 1 patch, Transdermal, Twice weekly    doxycycline (MONODOX) 100 mg, Oral, 2 times daily    ergocalciferol (ERGOCALCIFEROL) 50,000 Units, Oral, Every 7 days    lisinopriL 10 mg, Oral, Daily    omega-3 fatty acids/fish oil (FISH OIL-OMEGA-3 FATTY ACIDS) 300-1,000 mg capsule Daily    progesterone (PROMETRIUM) 100 MG capsule progesterone micronized 100 mg capsule   TAKE 1 CAPSULE BY MOUTH AT BEDTIME    QUEtiapine (SEROQUEL) 25 mg, Oral, Nightly       Patient has No Known Allergies.     Patient Care Team:  Whit Lombardo MD as PCP - General (Internal Medicine)     Subjective:     Review of Systems    12 point review of systems conducted, negative except as stated in the history of present illness. See HPI for details.    Objective:     Visit Vitals  /85 (BP Location: Left arm, Patient Position: Sitting)   Pulse 90   Ht 5' 4" (1.626 m)   Wt 94.3 kg (208 lb)   SpO2 (P) 98%   BMI 35.70 kg/m²       Physical Exam    General: Alert and oriented, No acute distress.  Head: Normocephalic, Atraumatic.  Eye: Sclera non-icteric.  Respiratory: Non-labored respirations, Symmetrical chest wall expansion.  Cardiovascular: Regular rate.  Gastrointestinal: Soft, Non-tender, Non-distended, Normal bowel sounds.  Integumentary: Warm, Dry, Intact, No rashes.  Extremities: No edema.  Neurologic: No focal deficits.    Labs Reviewed:     Chemistry:  Lab Results   Component Value Date     09/20/2024    K 5.0 09/20/2024    BUN " 13.1 09/20/2024    CREATININE 0.78 09/20/2024    EGFRNORACEVR >60 09/20/2024    GLUCOSE 97 09/20/2024    CALCIUM 9.6 09/20/2024    ALKPHOS 63 09/20/2024    LABPROT 6.3 (L) 09/20/2024    ALBUMIN 3.7 09/20/2024    BILIDIR 0.10 02/04/2020    IBILI 0.30 02/04/2020    AST 15 09/20/2024    ALT 18 09/20/2024        Hematology:  Lab Results   Component Value Date    WBC 4.63 09/20/2024    HGB 13.6 09/20/2024    HCT 42.4 09/20/2024     09/20/2024       Assessment:       ICD-10-CM ICD-9-CM   1. Anal condyloma  A63.0 078.11        Plan:       1. Anal condyloma      - Schedule condyloma excision for 1/17/25  - Explained the procedure in detail with risks and benefits. All questions answered at this time.     No follow-ups on file. In addition to their scheduled follow up, the patient has also been instructed to follow up on as needed basis.

## 2025-01-07 DIAGNOSIS — A63.0 ANAL CONDYLOMA: Primary | ICD-10-CM

## 2025-01-07 RX ORDER — SODIUM CHLORIDE 9 MG/ML
INJECTION, SOLUTION INTRAVENOUS CONTINUOUS
OUTPATIENT
Start: 2025-01-07

## 2025-01-07 RX ORDER — LIDOCAINE HYDROCHLORIDE 10 MG/ML
1 INJECTION, SOLUTION EPIDURAL; INFILTRATION; INTRACAUDAL; PERINEURAL ONCE
OUTPATIENT
Start: 2025-01-07 | End: 2025-01-07

## 2025-01-09 RX ORDER — ESTRADIOL 0.1 MG/G
CREAM VAGINAL
COMMUNITY
Start: 2024-12-27

## 2025-01-09 RX ORDER — IBUPROFEN 100 MG/5ML
1000 SUSPENSION, ORAL (FINAL DOSE FORM) ORAL DAILY
COMMUNITY

## 2025-01-09 RX ORDER — ZINC GLUCONATE 50 MG
50 TABLET ORAL DAILY
COMMUNITY

## 2025-01-10 ENCOUNTER — HOSPITAL ENCOUNTER (OUTPATIENT)
Dept: RADIOLOGY | Facility: HOSPITAL | Age: 60
Discharge: HOME | End: 2025-01-10
Attending: COLON & RECTAL SURGERY
Payer: COMMERCIAL

## 2025-01-10 ENCOUNTER — ANESTHESIA EVENT (OUTPATIENT)
Dept: SURGERY | Facility: HOSPITAL | Age: 60
End: 2025-01-10
Payer: COMMERCIAL

## 2025-01-10 DIAGNOSIS — A63.0 ANAL CONDYLOMA: ICD-10-CM

## 2025-01-10 PROCEDURE — 71046 X-RAY EXAM CHEST 2 VIEWS: CPT | Mod: TC

## 2025-01-16 ENCOUNTER — PATIENT MESSAGE (OUTPATIENT)
Dept: SURGERY | Facility: HOSPITAL | Age: 60
End: 2025-01-16
Payer: COMMERCIAL

## 2025-01-16 NOTE — PRE-PROCEDURE INSTRUCTIONS
"[facility]: Outpatient Surgery  Preprocedure Check-In Instructions     Your arrival time for your surgery or procedure is 5:00 am   We ask patients to arrive about 2 hours before surgery to allow for enough time to review your health history & medications, start your IV, complete any outstanding labwork or tests, and meet your Anesthesiologist.    Expectations: "Because of inconsistent procedure completion times, an unexpected wait may occur. The Physicians would like you to be here to prepare in the event they run ahead of time. We will make you as comfortable as possible and keep you informed. We apologize in advance if this happens."    You will arrive at [facility], [address], [place].  Enter through the [place] entrance next to the Emergency Room, and come to the 6th floor to the Outpatient Surgery Department.     Visitory Policy:  You are allowed 2 adult visitors to be with you in the hospital. All hospital visitors should be in good current health.  No small children.     What to Bring:  Please have your ID, insurance cards, and all home medication bottles with you at check in.  Bring your CPAP machine if one is used at home.     Fasting:  Nothing to eat after midnight the night before your procedure. This includes no gum and/or tobacco products. You may have clear liquids limited to only: WATER, GATORADE, POWERADE and children can also have PEDIALYTE AND/OR APPLE JUICE , up until 2 hours before your arrival time.   Follow your doctor's instructions for taking any medications on the morning of your procedure.  If no instructions for taking medications were given, do not take any medications but bring your medications in their bottles to your procedure check in.     Follow your doctor's preoperative instructions regarding skin prep, bowel prep, bathing, or showering prior to your procedure.  If any special soaps were provided to you, please use according to " your doctor's instructions. If no instructions were given from your doctor, take a good bath or shower with antibacterial soap the night before and the morning of your procedure.  On the morning of procedure, wear loose, comfortable clothing.  No lotions, makeup, perfumes, colognes, deodorant, or jewelry to your procedure.  Removable items (glasses, contact lenses, dentures, retainers, hearing aids) need to be removed for your procedure.  Bring your storage containers for these items if you wear them.     Artificial nails, body jewelry, eyelash extensions, and/or hair extensions with metal clips are not allowed during your surgery.  If you currently wear any of these items, please arrange for them to be removed prior to your arrival to the hospital.     Outpatient or Same Day Surgeries:  Any patients receiving sedation/anesthesia are advised not to drive for 24 hours after their procedure.  We do not allow patients to drive themselves home after discharge.  If you are going home after your procedure, please have someone available to drive you home from the hospital.        You may call the Outpatient Surgery Department at [phone] with any questions or concerns.  We are looking forward to meeting you and taking great care of you for your procedure.  Thank you for choosing [facility] for your surgical needs.

## 2025-01-17 ENCOUNTER — HOSPITAL ENCOUNTER (OUTPATIENT)
Facility: HOSPITAL | Age: 60
Discharge: HOME | End: 2025-01-17
Attending: COLON & RECTAL SURGERY | Admitting: COLON & RECTAL SURGERY
Payer: COMMERCIAL

## 2025-01-17 ENCOUNTER — ANESTHESIA (OUTPATIENT)
Dept: SURGERY | Facility: HOSPITAL | Age: 60
End: 2025-01-17
Payer: COMMERCIAL

## 2025-01-17 DIAGNOSIS — K64.3 GRADE IV HEMORRHOIDS: Primary | ICD-10-CM

## 2025-01-17 DIAGNOSIS — A63.0 ANAL CONDYLOMA: ICD-10-CM

## 2025-01-17 PROCEDURE — 37000008 HC ANESTHESIA 1ST 15 MINUTES: Performed by: COLON & RECTAL SURGERY

## 2025-01-17 PROCEDURE — 36000707: Performed by: COLON & RECTAL SURGERY

## 2025-01-17 PROCEDURE — 63600175 PHARM REV CODE 636 W HCPCS: Performed by: NURSE ANESTHETIST, CERTIFIED REGISTERED

## 2025-01-17 PROCEDURE — D9220A PRA ANESTHESIA: Mod: ANES,,, | Performed by: STUDENT IN AN ORGANIZED HEALTH CARE EDUCATION/TRAINING PROGRAM

## 2025-01-17 PROCEDURE — 63600175 PHARM REV CODE 636 W HCPCS: Performed by: COLON & RECTAL SURGERY

## 2025-01-17 PROCEDURE — 46255 REMOVE INT/EXT HEM 1 GROUP: CPT | Mod: 52,,, | Performed by: COLON & RECTAL SURGERY

## 2025-01-17 PROCEDURE — 36000706: Performed by: COLON & RECTAL SURGERY

## 2025-01-17 PROCEDURE — 63600175 PHARM REV CODE 636 W HCPCS: Mod: TB | Performed by: STUDENT IN AN ORGANIZED HEALTH CARE EDUCATION/TRAINING PROGRAM

## 2025-01-17 PROCEDURE — 71000033 HC RECOVERY, INTIAL HOUR: Performed by: COLON & RECTAL SURGERY

## 2025-01-17 PROCEDURE — 25000003 PHARM REV CODE 250: Performed by: NURSE ANESTHETIST, CERTIFIED REGISTERED

## 2025-01-17 PROCEDURE — 63600175 PHARM REV CODE 636 W HCPCS

## 2025-01-17 PROCEDURE — 37000009 HC ANESTHESIA EA ADD 15 MINS: Performed by: COLON & RECTAL SURGERY

## 2025-01-17 PROCEDURE — 71000016 HC POSTOP RECOV ADDL HR: Performed by: COLON & RECTAL SURGERY

## 2025-01-17 PROCEDURE — 71000015 HC POSTOP RECOV 1ST HR: Performed by: COLON & RECTAL SURGERY

## 2025-01-17 PROCEDURE — 25000003 PHARM REV CODE 250: Performed by: COLON & RECTAL SURGERY

## 2025-01-17 PROCEDURE — D9220A PRA ANESTHESIA: Mod: CRNA,,, | Performed by: NURSE ANESTHETIST, CERTIFIED REGISTERED

## 2025-01-17 PROCEDURE — 25000003 PHARM REV CODE 250

## 2025-01-17 RX ORDER — SODIUM CHLORIDE 9 MG/ML
INJECTION, SOLUTION INTRAVENOUS CONTINUOUS
Status: DISCONTINUED | OUTPATIENT
Start: 2025-01-17 | End: 2025-01-17 | Stop reason: HOSPADM

## 2025-01-17 RX ORDER — HYDROMORPHONE HYDROCHLORIDE 2 MG/ML
0.4 INJECTION, SOLUTION INTRAMUSCULAR; INTRAVENOUS; SUBCUTANEOUS EVERY 5 MIN PRN
Status: DISCONTINUED | OUTPATIENT
Start: 2025-01-17 | End: 2025-01-17 | Stop reason: HOSPADM

## 2025-01-17 RX ORDER — PROCHLORPERAZINE EDISYLATE 5 MG/ML
5 INJECTION INTRAMUSCULAR; INTRAVENOUS EVERY 30 MIN PRN
Status: DISCONTINUED | OUTPATIENT
Start: 2025-01-17 | End: 2025-01-17 | Stop reason: HOSPADM

## 2025-01-17 RX ORDER — FENTANYL CITRATE 50 UG/ML
INJECTION, SOLUTION INTRAMUSCULAR; INTRAVENOUS
Status: DISCONTINUED | OUTPATIENT
Start: 2025-01-17 | End: 2025-01-17

## 2025-01-17 RX ORDER — DIPHENHYDRAMINE HYDROCHLORIDE 50 MG/ML
25 INJECTION INTRAMUSCULAR; INTRAVENOUS EVERY 6 HOURS PRN
Status: DISCONTINUED | OUTPATIENT
Start: 2025-01-17 | End: 2025-01-17 | Stop reason: HOSPADM

## 2025-01-17 RX ORDER — LIDOCAINE HYDROCHLORIDE 10 MG/ML
1 INJECTION, SOLUTION EPIDURAL; INFILTRATION; INTRACAUDAL; PERINEURAL ONCE
Status: DISCONTINUED | OUTPATIENT
Start: 2025-01-17 | End: 2025-01-17 | Stop reason: HOSPADM

## 2025-01-17 RX ORDER — OXYCODONE AND ACETAMINOPHEN 10; 325 MG/1; MG/1
1 TABLET ORAL EVERY 4 HOURS PRN
Qty: 30 TABLET | Refills: 0 | Status: SHIPPED | OUTPATIENT
Start: 2025-01-17

## 2025-01-17 RX ORDER — LIDOCAINE HYDROCHLORIDE AND EPINEPHRINE 10; 10 UG/ML; MG/ML
INJECTION, SOLUTION INFILTRATION; PERINEURAL
Status: DISCONTINUED | OUTPATIENT
Start: 2025-01-17 | End: 2025-01-17 | Stop reason: HOSPADM

## 2025-01-17 RX ORDER — ONDANSETRON HYDROCHLORIDE 2 MG/ML
INJECTION, SOLUTION INTRAMUSCULAR; INTRAVENOUS
Status: DISCONTINUED | OUTPATIENT
Start: 2025-01-17 | End: 2025-01-17

## 2025-01-17 RX ORDER — ROCURONIUM BROMIDE 10 MG/ML
INJECTION, SOLUTION INTRAVENOUS
Status: DISCONTINUED | OUTPATIENT
Start: 2025-01-17 | End: 2025-01-17

## 2025-01-17 RX ORDER — PROPOFOL 10 MG/ML
VIAL (ML) INTRAVENOUS
Status: DISCONTINUED | OUTPATIENT
Start: 2025-01-17 | End: 2025-01-17

## 2025-01-17 RX ORDER — DEXAMETHASONE SODIUM PHOSPHATE 4 MG/ML
INJECTION, SOLUTION INTRA-ARTICULAR; INTRALESIONAL; INTRAMUSCULAR; INTRAVENOUS; SOFT TISSUE
Status: DISCONTINUED | OUTPATIENT
Start: 2025-01-17 | End: 2025-01-17

## 2025-01-17 RX ORDER — MEPERIDINE HYDROCHLORIDE 25 MG/ML
12.5 INJECTION INTRAMUSCULAR; INTRAVENOUS; SUBCUTANEOUS EVERY 10 MIN PRN
Status: DISCONTINUED | OUTPATIENT
Start: 2025-01-17 | End: 2025-01-17 | Stop reason: HOSPADM

## 2025-01-17 RX ORDER — PHENYLEPHRINE HCL IN 0.9% NACL 1 MG/10 ML
SYRINGE (ML) INTRAVENOUS
Status: DISCONTINUED | OUTPATIENT
Start: 2025-01-17 | End: 2025-01-17

## 2025-01-17 RX ORDER — LIDOCAINE HYDROCHLORIDE 20 MG/ML
INJECTION, SOLUTION EPIDURAL; INFILTRATION; INTRACAUDAL; PERINEURAL
Status: DISCONTINUED | OUTPATIENT
Start: 2025-01-17 | End: 2025-01-17

## 2025-01-17 RX ORDER — METOCLOPRAMIDE HYDROCHLORIDE 5 MG/ML
10 INJECTION INTRAMUSCULAR; INTRAVENOUS EVERY 10 MIN PRN
Status: DISCONTINUED | OUTPATIENT
Start: 2025-01-17 | End: 2025-01-17 | Stop reason: HOSPADM

## 2025-01-17 RX ORDER — OXYCODONE AND ACETAMINOPHEN 10; 325 MG/1; MG/1
1 TABLET ORAL ONCE
Status: COMPLETED | OUTPATIENT
Start: 2025-01-17 | End: 2025-01-17

## 2025-01-17 RX ORDER — MIDAZOLAM HYDROCHLORIDE 1 MG/ML
INJECTION INTRAMUSCULAR; INTRAVENOUS
Status: DISCONTINUED | OUTPATIENT
Start: 2025-01-17 | End: 2025-01-17

## 2025-01-17 RX ADMIN — LIDOCAINE HYDROCHLORIDE 80 MG: 20 INJECTION, SOLUTION INTRAVENOUS at 07:01

## 2025-01-17 RX ADMIN — ONDANSETRON 4 MG: 2 INJECTION INTRAMUSCULAR; INTRAVENOUS at 08:01

## 2025-01-17 RX ADMIN — Medication 100 MCG: at 07:01

## 2025-01-17 RX ADMIN — MIDAZOLAM HYDROCHLORIDE 2 MG: 1 INJECTION, SOLUTION INTRAMUSCULAR; INTRAVENOUS at 07:01

## 2025-01-17 RX ADMIN — OXYCODONE HYDROCHLORIDE AND ACETAMINOPHEN 1 TABLET: 10; 325 TABLET ORAL at 09:01

## 2025-01-17 RX ADMIN — HYDROMORPHONE HYDROCHLORIDE 0.4 MG: 2 INJECTION, SOLUTION INTRAMUSCULAR; INTRAVENOUS; SUBCUTANEOUS at 08:01

## 2025-01-17 RX ADMIN — PROPOFOL 50 MG: 10 INJECTION, EMULSION INTRAVENOUS at 08:01

## 2025-01-17 RX ADMIN — FENTANYL CITRATE 100 MCG: 50 INJECTION, SOLUTION INTRAMUSCULAR; INTRAVENOUS at 07:01

## 2025-01-17 RX ADMIN — PROPOFOL 120 MG: 10 INJECTION, EMULSION INTRAVENOUS at 07:01

## 2025-01-17 RX ADMIN — HYDROMORPHONE HYDROCHLORIDE 0.4 MG: 2 INJECTION, SOLUTION INTRAMUSCULAR; INTRAVENOUS; SUBCUTANEOUS at 09:01

## 2025-01-17 RX ADMIN — SODIUM CHLORIDE, SODIUM GLUCONATE, SODIUM ACETATE, POTASSIUM CHLORIDE AND MAGNESIUM CHLORIDE: 526; 502; 368; 37; 30 INJECTION, SOLUTION INTRAVENOUS at 07:01

## 2025-01-17 RX ADMIN — DEXAMETHASONE SODIUM PHOSPHATE 4 MG: 4 INJECTION, SOLUTION INTRA-ARTICULAR; INTRALESIONAL; INTRAMUSCULAR; INTRAVENOUS; SOFT TISSUE at 08:01

## 2025-01-17 RX ADMIN — ROCURONIUM BROMIDE 50 MG: 10 SOLUTION INTRAVENOUS at 07:01

## 2025-01-17 RX ADMIN — LIDOCAINE HYDROCHLORIDE 40 MG: 20 INJECTION, SOLUTION INTRAVENOUS at 08:01

## 2025-01-17 RX ADMIN — SUGAMMADEX 200 MG: 100 INJECTION, SOLUTION INTRAVENOUS at 08:01

## 2025-01-17 NOTE — ANESTHESIA PREPROCEDURE EVALUATION
01/17/2025  Carly Macario is a 59 y.o., female.    H/h ok, lytes nl  Ecg nsr, nl ecg    Pre-op Assessment    I have reviewed the Patient Summary Reports.     I have reviewed the Nursing Notes. I have reviewed the NPO Status.   I have reviewed the Medications.     Review of Systems  Anesthesia Hx:               Denies Personal Hx of Anesthesia complications.                    Cardiovascular:     Hypertension                                    Hypertension         Pulmonary:        Sleep Apnea     Obstructive Sleep Apnea (ALFREDO).           Hepatic/GI:  Hepatic/GI Normal                    Musculoskeletal:  Arthritis        Arthritis          Neurological:      Arthritis                           Endocrine:        Obesity / BMI > 30  Psych:  Psychiatric History                  Physical Exam  General: Well nourished, Cooperative and Alert    Airway:  Mallampati: II   Mouth Opening: Normal  TM Distance: Normal  Tongue: Normal    Dental:  Intact    Chest/Lungs:  Normal Respiratory Rate    Heart:  Rate: Normal        Anesthesia Plan  Type of Anesthesia, risks & benefits discussed:    Anesthesia Type: Gen ETT, Gen Supraglottic Airway  Intra-op Monitoring Plan: Standard ASA Monitors  Post Op Pain Control Plan: multimodal analgesia  Induction:  IV  Informed Consent: Informed consent signed with the Patient and all parties understand the risks and agree with anesthesia plan.  All questions answered. Patient consented to blood products? Yes  ASA Score: 2  Day of Surgery Review of History & Physical: H&P Update referred to the surgeon/provider.  Anesthesia Plan Notes:   ETT if prone    Ready For Surgery From Anesthesia Perspective.     .

## 2025-01-17 NOTE — DISCHARGE INSTRUCTIONS
May remove dressing and apply ice for 24 hours, then recommend warm Sitz baths as needed for pain and after BM.     Take Miralax one capful daily, starting today.    Avoid foods high in fiber and fiber supplement.    Recommend Recticare or Lidocaine cream externally as needed for pain.    Mild bleeding or discharge is expected.    Notify MD for temperature >101, excessive bleeding, and difficulty urinating.    BLEEDING: if you experience uncontrollable bleeding, contact your doctor and go to ER.    NAUSEA: due to the anesthesia, you may experience nausea for up to 24 hours. If nausea and vomiting last longer, contact your doctor.     INFECTION:  watch for any signs or symptoms of infection such as chills or fever. Notify your doctor.     PAIN : take your pain medications as directed. If the pain medications are not helping, notify your doctor.

## 2025-01-17 NOTE — DISCHARGE SUMMARY
[facility]  Short Stay    Procedure(s) (LRB):  HEMORRHOIDECTOMY (N/A)      OUTCOME: Patient tolerated treatment/procedure well without complication and is now ready for discharge.    DISPOSITION: Home or Self Care    FINAL DIAGNOSIS:  Grade IV hemorrhoids    FOLLOWUP: In clinic    DISCHARGE INSTRUCTIONS:    Discharge Procedure Orders   Diet Adult Regular     Ice to affected area   Order Comments: For 24 hours     Lifting restrictions   Order Comments: Avoid heavy lifting and excessive straining         Clinical Reference Documents Added to Patient Instructions         Document    HEMORRHOIDECTOMY DISCHARGE INSTRUCTIONS (ENGLISH)            TIME SPENT ON DISCHARGE: 15 minutes

## 2025-01-17 NOTE — INTERVAL H&P NOTE
The patient has been examined and the H&P has been reviewed:    I concur with the findings and no changes have occurred since H&P was written.    Surgery risks, benefits and alternative options discussed and understood by patient/family.          Active Hospital Problems    Diagnosis  POA    *Condyloma [A63.0]  Yes      Resolved Hospital Problems   No resolved problems to display.

## 2025-01-17 NOTE — OP NOTE
[facility] - Periop Services  Operative Note      Date of Procedure: 1/17/2025     Procedure: Procedure(s) (LRB):  HEMORRHOIDECTOMY (N/A)     Surgeons and Role:     * Sorin Qiu MD - Primary    Assisting Surgeon: None    Pre-Operative Diagnosis: Anal condyloma [A63.0]    Post-Operative Diagnosis: Grade 4 hemorrhoid    Anesthesia: General    Estimated Blood Loss (EBL): 10 mL           Specimens: Right posterior hemorrhoid     Description of Technical Procedures:  After informed consent was obtained, patient was brought to the operating room.  General endotracheal anesthesia was administered by member of the anesthesia team.  Patient was placed in the prone nura-knife position.  Buttocks were taped apart for exposure.  The perianal skin was prepped and draped in sterile surgical fashion.  A medium Hill-Lopez retractor was inserted and all 4 quadrants inspected.  No suspicious or verrucous lesions were identified.  There was a prolapsed right posterior internal hemorrhoid column with granulation type tissue.  Lidocaine with epinephrine was used for local anesthesia.  The anoderm was incised sharply with a 15 blade and the hemorrhoidal column was carefully dissected off of the underlying sphincter complex with Metzenbaum scissors.  Excision was completed with electrocautery.  The hemorrhoid was sent for permanent pathology.  Hemostasis was achieved with spot cautery.  The anoderm was reapproximated with running locking 2-0 chromic suture.  The apex was reinforced with a figure-of-eight 2-0 Vicryl suture.  Upon completion the suture line was hemostatic.  A dry fluff dressing was applied and secured with tape.  Patient tolerated the procedure well and there were no complications.  Patient was returned to the supine position, awakened, extubated and subsequently transferred to recovery in satisfactory condition.  
[facility]

## 2025-01-17 NOTE — ANESTHESIA PROCEDURE NOTES
Intubation    Date/Time: 1/17/2025 7:15 AM    Performed by: Clint Naik CRNA  Authorized by: Andreas Sam MD    Intubation:     Induction:  Intravenous    Intubated:  Postinduction    Mask Ventilation:  Easy with oral airway    Attempts:  1    Attempted By:  CRNA    Method of Intubation:  Direct    Blade:  Amberly 3    Laryngeal View Grade: Grade IIA - cords partially seen      Difficult Airway Encountered?: No      Complications:  None    Airway Device:  Oral endotracheal tube    Airway Device Size:  7.5    Style/Cuff Inflation:  Cuffed (inflated to minimal occlusive pressure)    Inflation Amount (mL):  6    Tube secured:  21    Secured at:  The lips    Placement Verified By:  Capnometry    Complicating Factors:  None    Findings Post-Intubation:  BS equal bilateral and atraumatic/condition of teeth unchanged

## 2025-01-17 NOTE — ANESTHESIA PROCEDURE NOTES
Peripheral IV Insertion    Diagnosis: I87.9 Disorder of vein, unspecified.    Patient location during procedure: OR  Timeout: 1/17/2025 7:45 AM  Procedure end time: 1/17/2025 7:47 AM    Staffing  Authorizing Provider: Andreas Sam MD  Performing Provider: Clint Naik CRNA    Staffing  Performed by: Clint Naik CRNA  Authorized by: Andreas Sam MD    Anesthesiologist was present at the time of the procedure.    Preanesthetic Checklist  Completed: patient identified, IV checked, site marked, risks and benefits discussed, surgical consent, monitors and equipment checked, pre-op evaluation, timeout performed and anesthesia consent givenPeripheral IV Insertion  Skin Prep: alcohol swabs  Local Infiltration: none  Orientation: right  Location: forearm  Catheter Type: peripheral IV (single lumen)  Catheter Size: 20 G  Catheter placement by Anatomical landmarks. Heme positive aspiration all ports. Insertion Attempts: 1  Assessment  Dressing: secured with tape and tegaderm  Patient: Tolerated well  Line flushed easily.

## 2025-01-17 NOTE — ANESTHESIA POSTPROCEDURE EVALUATION
Anesthesia Post Evaluation    Patient: Carly Macario    Procedure(s) Performed: Procedure(s) (LRB):  HEMORRHOIDECTOMY (N/A)    Final Anesthesia Type: general      Patient location during evaluation: PACU  Patient participation: Yes- Able to Participate  Level of consciousness: awake and alert  Post-procedure vital signs: reviewed and stable  Pain management: adequate  Airway patency: patent    PONV status at discharge: No PONV  Anesthetic complications: no      Respiratory status: unassisted  Hydration status: euvolemic  Follow-up not needed.              Vitals Value Taken Time   /57 01/17/25 0842   Temp 36.2 °C (97.2 °F) 01/17/25 0838   Pulse 75 01/17/25 0850   Resp 17 01/17/25 0850   SpO2 98 % 01/17/25 0850   Vitals shown include unfiled device data.      No case tracking events are documented in the log.      Pain/Isaura Score: Isaura Score: 8 (1/17/2025  8:33 AM)

## 2025-01-17 NOTE — TRANSFER OF CARE
"Anesthesia Transfer of Care Note    Patient: Carly Macario    Procedure(s) Performed: Procedure(s) (LRB):  HEMORRHOIDECTOMY (N/A)    Patient location: PACU    Anesthesia Type: general    Transport from OR: Transported from OR on room air with adequate spontaneous ventilation    Post pain: adequate analgesia    Post assessment: no apparent anesthetic complications    Post vital signs: stable    Level of consciousness: responds to stimulation    Nausea/Vomiting: no nausea/vomiting    Complications: none    Transfer of care protocol was followed    Last vitals: Visit Vitals  BP 93/62   Pulse 76   Temp 36.2 °C (97.2 °F)   Resp 12   Ht 5' 4" (1.626 m)   Wt 93.1 kg (205 lb 4 oz)   SpO2 95%   Breastfeeding No   BMI 35.23 kg/m²     "

## 2025-01-20 LAB — PSYCHE PATHOLOGY RESULT: NORMAL

## 2025-01-24 VITALS
TEMPERATURE: 98 F | OXYGEN SATURATION: 96 % | BODY MASS INDEX: 35.04 KG/M2 | HEART RATE: 72 BPM | RESPIRATION RATE: 20 BRPM | DIASTOLIC BLOOD PRESSURE: 75 MMHG | WEIGHT: 205.25 LBS | SYSTOLIC BLOOD PRESSURE: 110 MMHG | HEIGHT: 64 IN

## 2025-01-30 ENCOUNTER — OFFICE VISIT (OUTPATIENT)
Dept: SURGICAL ONCOLOGY | Facility: CLINIC | Age: 60
End: 2025-01-30
Payer: COMMERCIAL

## 2025-01-30 VITALS
BODY MASS INDEX: 34.83 KG/M2 | SYSTOLIC BLOOD PRESSURE: 106 MMHG | DIASTOLIC BLOOD PRESSURE: 71 MMHG | HEIGHT: 64 IN | HEART RATE: 71 BPM | WEIGHT: 204 LBS

## 2025-01-30 DIAGNOSIS — Z87.19 S/P HEMORRHOIDECTOMY: Primary | ICD-10-CM

## 2025-01-30 DIAGNOSIS — Z98.890 S/P HEMORRHOIDECTOMY: Primary | ICD-10-CM

## 2025-01-30 PROCEDURE — 99999 PR PBB SHADOW E&M-EST. PATIENT-LVL IV: CPT | Mod: PBBFAC,,,

## 2025-01-30 PROCEDURE — 1159F MED LIST DOCD IN RCRD: CPT | Mod: CPTII,S$GLB,,

## 2025-01-30 PROCEDURE — 99024 POSTOP FOLLOW-UP VISIT: CPT | Mod: S$GLB,,,

## 2025-01-30 PROCEDURE — 3074F SYST BP LT 130 MM HG: CPT | Mod: CPTII,S$GLB,,

## 2025-01-30 PROCEDURE — 1160F RVW MEDS BY RX/DR IN RCRD: CPT | Mod: CPTII,S$GLB,,

## 2025-01-30 PROCEDURE — 3078F DIAST BP <80 MM HG: CPT | Mod: CPTII,S$GLB,,

## 2025-01-30 NOTE — PROGRESS NOTES
"   Colorectal Surgery  Patient ID: [id number]     HPI:     Carly Macario is a 59 y.o. female here today for a post op visit after having a hemorrhoidectomy on 1/17/25. Intra op, a verrucous lesion was not appreciated and a right posterior internal hemorrhoid was visualized with granulation tissue.     Pathology: Hemorrhoid.     Today she reports minimal pain with scant blood after a bowel movement when she wipes. She states she is no longer taking percocet for pain.     Current Outpatient Medications   Medication Instructions    ascorbic acid (vitamin C) (VITAMIN C) 1,000 mg, Daily    cyanocobalamin, vitamin B-12, (VITAMIN B-12 ORAL) 1 tablet, Daily    AGUEDA 0.05 mg/24 hr 1 patch, Transdermal, Twice weekly    ergocalciferol (ERGOCALCIFEROL) 50,000 Units, Oral, Every 7 days    estradioL (ESTRACE) 0.01 % (0.1 mg/gram) vaginal cream Place vaginally.    lisinopriL 10 mg, Oral, Daily    multivit-min/vit C/herb no.124 (AIRBORNE GUMMY ORAL) 3 tablets, Daily    omega-3 fatty acids/fish oil (FISH OIL-OMEGA-3 FATTY ACIDS) 300-1,000 mg capsule 1 capsule, Daily    oxyCODONE-acetaminophen (PERCOCET)  mg per tablet 1 tablet, Oral, Every 4 hours PRN    QUEtiapine (SEROQUEL) 25 mg, Oral, Nightly    zinc gluconate 50 mg, Daily       Patient has No Known Allergies.     Patient Care Team:  Whit Lombardo MD as PCP - General (Internal Medicine)     Objective:     Visit Vitals  /71 (BP Location: Left arm, Patient Position: Sitting)   Pulse 71   Ht 5' 4" (1.626 m)   Wt 92.5 kg (204 lb)   SpO2 (P) 98%   BMI 35.02 kg/m²       Physical Exam    General: Alert and oriented, No acute distress.  Head: Normocephalic, Atraumatic.  Eye: Sclera non-icteric.  Respiratory: Non-labored respirations, Symmetrical chest wall expansion.  Gastrointestinal: Soft, Non-distended. Non-tender.   Rectal: Healing posterior right anal incision  Extremities: No lower extremity edema.  Integumentary: Warm, Dry, Intact.  Neurologic: No focal " deficits.    Assessment:       ICD-10-CM ICD-9-CM   1. S/P hemorrhoidectomy  Z98.890 V45.89    Z87.19         Plan:       1. S/P hemorrhoidectomy    - Ibuprofen/tylenol PRN pain  - Continue sitz baths for inflammation/pain  - RTC in 4 weeks for recheck    No follow-ups on file. In addition to their scheduled follow up, the patient has also been instructed to follow up on as needed basis.     Future Appointments   Date Time Provider Department Center   2/20/2025  8:30 AM Rut Chiu FNP [facility]   4/25/2025  8:00 AM Whit Lombardo MD [facility] [place]        MICHELLE Kendall

## 2025-02-20 ENCOUNTER — OFFICE VISIT (OUTPATIENT)
Dept: SURGICAL ONCOLOGY | Facility: CLINIC | Age: 60
End: 2025-02-20
Payer: COMMERCIAL

## 2025-02-20 VITALS
SYSTOLIC BLOOD PRESSURE: 123 MMHG | BODY MASS INDEX: 34.8 KG/M2 | DIASTOLIC BLOOD PRESSURE: 84 MMHG | HEART RATE: 81 BPM | HEIGHT: 64 IN | WEIGHT: 203.81 LBS

## 2025-02-20 DIAGNOSIS — Z98.890 S/P HEMORRHOIDECTOMY: Primary | ICD-10-CM

## 2025-02-20 DIAGNOSIS — Z87.19 S/P HEMORRHOIDECTOMY: Primary | ICD-10-CM

## 2025-02-20 NOTE — PROGRESS NOTES
"   Colorectal Surgery  Patient ID: [id number]     HPI:     Carly Macario is a 59 y.o. female here today for a post op visit after having a hemorrhoidectomy on 1/17/25. Today she reports minimal rectal pain and no rectal bleeding. She denies any other complaints.     Current Outpatient Medications   Medication Instructions    ascorbic acid (vitamin C) (VITAMIN C) 1,000 mg, Daily    cyanocobalamin, vitamin B-12, (VITAMIN B-12 ORAL) 1 tablet, Daily    AGUEDA 0.05 mg/24 hr 1 patch, Transdermal, Twice weekly    ergocalciferol (ERGOCALCIFEROL) 50,000 Units, Oral, Every 7 days    estradioL (ESTRACE) 0.01 % (0.1 mg/gram) vaginal cream Place vaginally.    lisinopriL 10 mg, Oral, Daily    multivit-min/vit C/herb no.124 (AIRBORNE GUMMY ORAL) 3 tablets, Daily    omega-3 fatty acids/fish oil (FISH OIL-OMEGA-3 FATTY ACIDS) 300-1,000 mg capsule 1 capsule, Daily    oxyCODONE-acetaminophen (PERCOCET)  mg per tablet 1 tablet, Oral, Every 4 hours PRN    QUEtiapine (SEROQUEL) 25 mg, Oral, Nightly    zinc gluconate 50 mg, Daily       Patient has no known allergies.     Patient Care Team:  Whit Lombardo MD as PCP - General (Internal Medicine)     Objective:     Visit Vitals  /84 (BP Location: Right arm, Patient Position: Sitting)   Pulse 81   Ht 5' 4" (1.626 m)   Wt 92.4 kg (203 lb 12.8 oz)   SpO2 (P) 98%   BMI 34.98 kg/m²       Physical Exam    General: Alert and oriented, No acute distress.  Head: Normocephalic, Atraumatic.  Eye: Sclera non-icteric.  Respiratory: Non-labored respirations, Symmetrical chest wall expansion.  Gastrointestinal: Soft, Non-distended. Non-tender.   Rectal: Healing posterior right anal incision with mild swelling  Extremities: No lower extremity edema.  Integumentary: Warm, Dry, Intact.  Neurologic: No focal deficits.    Assessment:       ICD-10-CM ICD-9-CM   1. S/P hemorrhoidectomy  Z98.890 V45.89    Z87.19         Plan:       1. S/P hemorrhoidectomy    - RTC PRN    No follow-ups on " file. In addition to their scheduled follow up, the patient has also been instructed to follow up on as needed basis.     Future Appointments   Date Time Provider Department Center   4/25/2025  8:00 AM Whit Lombardo MD [facility]   10/13/2025  8:00 AM [facility] GIDEON CarbajalP

## 2025-03-01 DIAGNOSIS — Z79.890 HORMONE REPLACEMENT THERAPY: ICD-10-CM

## 2025-03-03 RX ORDER — ESTRADIOL 0.05 MG/D
1 PATCH, EXTENDED RELEASE TRANSDERMAL
Qty: 8 PATCH | Refills: 3 | Status: SHIPPED | OUTPATIENT
Start: 2025-03-03

## 2025-04-21 ENCOUNTER — TELEPHONE (OUTPATIENT)
Dept: PRIMARY CARE CLINIC | Facility: CLINIC | Age: 60
End: 2025-04-21
Payer: COMMERCIAL

## 2025-04-21 NOTE — TELEPHONE ENCOUNTER
Called Pt to confirm appointment on 04/25/2025 and that fasting lab work is needed. Pt verbalized understanding

## 2025-04-25 ENCOUNTER — OFFICE VISIT (OUTPATIENT)
Dept: PRIMARY CARE CLINIC | Facility: CLINIC | Age: 60
End: 2025-04-25
Payer: COMMERCIAL

## 2025-04-25 VITALS
SYSTOLIC BLOOD PRESSURE: 135 MMHG | HEIGHT: 64 IN | RESPIRATION RATE: 19 BRPM | BODY MASS INDEX: 34.86 KG/M2 | TEMPERATURE: 98 F | DIASTOLIC BLOOD PRESSURE: 81 MMHG | OXYGEN SATURATION: 98 % | HEART RATE: 73 BPM | WEIGHT: 204.19 LBS

## 2025-04-25 DIAGNOSIS — J01.90 ACUTE RHINOSINUSITIS: Primary | ICD-10-CM

## 2025-04-25 DIAGNOSIS — E66.01 SEVERE OBESITY (BMI 35.0-39.9) WITH COMORBIDITY: ICD-10-CM

## 2025-04-25 DIAGNOSIS — L65.9 HAIR LOSS: ICD-10-CM

## 2025-04-25 DIAGNOSIS — R53.83 FATIGUE, UNSPECIFIED TYPE: ICD-10-CM

## 2025-04-25 DIAGNOSIS — I10 PRIMARY HYPERTENSION: Chronic | ICD-10-CM

## 2025-04-25 DIAGNOSIS — E53.8 VITAMIN B12 DEFICIENCY: Chronic | ICD-10-CM

## 2025-04-25 RX ORDER — METHYLPREDNISOLONE 4 MG/1
TABLET ORAL
Qty: 21 EACH | Refills: 0 | Status: SHIPPED | OUTPATIENT
Start: 2025-04-25 | End: 2025-05-16

## 2025-04-25 RX ORDER — DOXYCYCLINE 100 MG/1
100 CAPSULE ORAL EVERY 12 HOURS
Qty: 14 CAPSULE | Refills: 0 | Status: SHIPPED | OUTPATIENT
Start: 2025-04-25 | End: 2025-05-02

## 2025-04-25 RX ORDER — AZELASTINE 1 MG/ML
1 SPRAY, METERED NASAL 2 TIMES DAILY
Qty: 30 ML | Refills: 0 | Status: SHIPPED | OUTPATIENT
Start: 2025-04-25 | End: 2026-04-25

## 2025-04-26 NOTE — PROGRESS NOTES
"Subjective:       Patient ID: Carly Macario is a 59 y.o. female.    -------------------------------------    Anal condyloma    Anxiety    History of depression    Hypertension    Insomnia    Obesity    Sleep apnea    "mild" was told didn't need a machine      Chief Complaint: Sinus Problem    History of Present Illness    CHIEF COMPLAINT:  Patient presents today for follow up of fatigue and weight gain    FATIGUE AND CONSTITUTIONAL SYMPTOMS:  She reports ongoing fatigue and feeling run down. Previous lab work showed low vitamin D levels and slightly elevated B12 levels, despite history of vitamin b12 deficiency. She is currently taking vitamin D supplementation. Will go get repeat labs done soon, was unable to do so yesterday due to the weather.      WEIGHT MANAGEMENT:  She reports gaining 20 lbs and acknowledges limited exercise.    DERMATOLOGIC:  She reports hair loss and very dry skin.    SINUS SYMPTOMS:  She's been experiencing sinus headaches with associated eye pressure and recent episodes of sneezing occurring yesterday and today. She denies fever or chills.Not sure what she can take since she has high blood pressure.     CURRENT MEDICATIONS:  She takes blood pressure medication and sleep medicine at night.        Review of Systems   Constitutional:  Positive for fatigue and unexpected weight change. Negative for chills and fever.   HENT:  Positive for rhinorrhea and sinus pressure/congestion.    Respiratory:  Negative for cough.    Cardiovascular:  Negative for chest pain.   Genitourinary:  Negative for dysuria and hematuria.   Neurological:  Positive for headaches.   Psychiatric/Behavioral:  Negative for sleep disturbance.          Objective:      Physical Exam  Vitals and nursing note reviewed.   Constitutional:       General: She is not in acute distress.     Appearance: Normal appearance. She is not ill-appearing.   HENT:      Head: Normocephalic.      Right Ear: Tympanic membrane and external ear " normal.      Left Ear: Tympanic membrane and external ear normal.      Nose: Congestion and rhinorrhea present.      Right Turbinates: Enlarged and swollen.      Left Turbinates: Enlarged and swollen.      Right Sinus: Frontal sinus tenderness present.      Left Sinus: Frontal sinus tenderness present.      Mouth/Throat:      Mouth: Mucous membranes are moist.   Eyes:      General: No scleral icterus.     Conjunctiva/sclera: Conjunctivae normal.   Cardiovascular:      Rate and Rhythm: Normal rate and regular rhythm.   Pulmonary:      Effort: Pulmonary effort is normal. No respiratory distress.   Musculoskeletal:         General: No deformity.   Skin:     General: Skin is warm and dry.      Coloration: Skin is not jaundiced.   Neurological:      Mental Status: She is alert and oriented to person, place, and time. Mental status is at baseline.      Gait: Gait normal.   Psychiatric:         Mood and Affect: Mood normal.         Behavior: Behavior normal.         Assessment & Plan:   Assessment & Plan    IMPRESSION:  Diagnosed sinus infection based on reported symptoms and exam.  Considered weight management options, including prescription medications like Adipex, Zepbound (tirzepatide).    HYPERTENSION:  - Continue patient's hypertension medication regimen, well controlled.  - Advised against medications containing decongestants due to potential blood pressure increase.  - Recommend Coricidin HBP for sinus and cold symptoms, which is safe for patients with hypertension.    ACUTE SINUSITIS:  - Initiated antibiotic treatment for sinus infection.  - Prescribed a steroid pack to reduce inflammation and antibiotics for the sinus infection.  - Patient reports sinus headaches, pressure on eyes, and some congestion.  - Initiated Zyrtec for allergy symptoms, to be taken in the morning.  - Prescribed Astelin nasal spray (antihistamine) for nasal symptoms, to be used at night.  - Acknowledged seasonal changes contributing to  allergy symptoms.  - Patient reports intermittent sneezing.    VITAMIN D DEFICIENCY:  - Plan to recheck vitamin D levels given fatigue   - Patient's vitamin D levels have been low in the past.    VITAMIN B12 DEFICIENCY:  - Plan to recheck B12 levels given fatigue   - Noted that previous vitamin B12 levels were slightly elevated last time  - history of deficiency in the past.    FATIGUE:  - Ordered expanded lab workup to investigate persistent fatigue, including iron, folate, thyroid, B12, and vitamin D levels.  - Patient reports feeling run down, tired, and drained.    SEVERE OBESITY:  - Explained the difference between FDA-regulated pure forms of weight loss medications and compounded versions available at [facility].  - Discussed the potential risks and reduced regulation of compounded medications.  - Instructed the patient to contact their insurance company to inquire about coverage for weight loss medications like Wegovy or Zepbound  -Encouraged healthy lifestyle/diet modifications   -Exercise 3-5x a week (>30 minutes, including a variety of cardio, weightbearing and lifting exercises)   -Eat a well balanced diet comprised of fruit/vegetables, lean protein, and complex carbs  .  HAIR LOSS:  - Plan to check various vitamin levels to address hair loss concerns.    Follow up in about 6 months (around 10/25/2025) for Wellness, Lab Results. In addition to their scheduled follow up, the patient has also been instructed to follow up on as needed basis.    This note was generated with the assistance of ambient listening technology. Verbal consent was obtained by the patient and accompanying visitor(s) for the recording of patient appointment to facilitate this note. I attest to having reviewed and edited the generated note for accuracy, though some syntax or spelling errors may persist. Please contact the author of this note for any clarification.

## 2025-04-30 ENCOUNTER — LAB VISIT (OUTPATIENT)
Dept: LAB | Facility: HOSPITAL | Age: 60
End: 2025-04-30
Attending: STUDENT IN AN ORGANIZED HEALTH CARE EDUCATION/TRAINING PROGRAM
Payer: COMMERCIAL

## 2025-04-30 DIAGNOSIS — Z82.49 FAMILY HISTORY OF EARLY CAD: ICD-10-CM

## 2025-04-30 DIAGNOSIS — E55.9 VITAMIN D DEFICIENCY: Chronic | ICD-10-CM

## 2025-04-30 DIAGNOSIS — E53.8 VITAMIN B12 DEFICIENCY: Chronic | ICD-10-CM

## 2025-04-30 DIAGNOSIS — R53.83 FATIGUE, UNSPECIFIED TYPE: ICD-10-CM

## 2025-04-30 DIAGNOSIS — L65.9 HAIR LOSS: ICD-10-CM

## 2025-04-30 DIAGNOSIS — E78.1 HYPERTRIGLYCERIDEMIA: ICD-10-CM

## 2025-04-30 LAB
25(OH)D3+25(OH)D2 SERPL-MCNC: 20 NG/ML (ref 30–80)
CHOLEST SERPL-MCNC: 166 MG/DL
CHOLEST/HDLC SERPL: 4 {RATIO} (ref 0–5)
FERRITIN SERPL-MCNC: 53.62 NG/ML (ref 4.63–204)
FOLATE SERPL-MCNC: 7.2 NG/ML (ref 7–31.4)
HDLC SERPL-MCNC: 45 MG/DL (ref 35–60)
LDLC SERPL CALC-MCNC: 100 MG/DL (ref 50–140)
TRIGL SERPL-MCNC: 107 MG/DL (ref 37–140)
TSH SERPL-ACNC: 3.51 UIU/ML (ref 0.35–4.94)
VIT B12 SERPL-MCNC: 416 PG/ML (ref 213–816)
VLDLC SERPL CALC-MCNC: 21 MG/DL

## 2025-04-30 PROCEDURE — 80061 LIPID PANEL: CPT

## 2025-04-30 PROCEDURE — 82728 ASSAY OF FERRITIN: CPT

## 2025-04-30 PROCEDURE — 36415 COLL VENOUS BLD VENIPUNCTURE: CPT

## 2025-04-30 PROCEDURE — 84443 ASSAY THYROID STIM HORMONE: CPT

## 2025-04-30 PROCEDURE — 82306 VITAMIN D 25 HYDROXY: CPT

## 2025-04-30 PROCEDURE — 82746 ASSAY OF FOLIC ACID SERUM: CPT

## 2025-04-30 PROCEDURE — 82607 VITAMIN B-12: CPT

## 2025-05-01 ENCOUNTER — RESULTS FOLLOW-UP (OUTPATIENT)
Dept: PRIMARY CARE CLINIC | Facility: CLINIC | Age: 60
End: 2025-05-01

## 2025-05-01 DIAGNOSIS — Z00.00 WELLNESS EXAMINATION: Chronic | ICD-10-CM

## 2025-05-01 DIAGNOSIS — E55.9 VITAMIN D DEFICIENCY: Primary | Chronic | ICD-10-CM

## 2025-05-01 DIAGNOSIS — E53.8 VITAMIN B12 DEFICIENCY: Chronic | ICD-10-CM

## 2025-05-01 DIAGNOSIS — E66.01 SEVERE OBESITY (BMI 35.0-39.9) WITH COMORBIDITY: ICD-10-CM

## 2025-05-01 DIAGNOSIS — I10 PRIMARY HYPERTENSION: Chronic | ICD-10-CM

## 2025-05-01 DIAGNOSIS — L65.9 HAIR LOSS: ICD-10-CM

## 2025-05-01 RX ORDER — ERGOCALCIFEROL 1.25 MG/1
50000 CAPSULE ORAL
Qty: 24 CAPSULE | Refills: 3 | Status: SHIPPED | OUTPATIENT
Start: 2025-05-01 | End: 2026-05-01

## 2025-05-01 NOTE — PROGRESS NOTES
MsPascale Carroll,     Vitamin D is still low, lets increase to twice weekly 50,000 U of vitamin D supplementation. Will repeat at your wellness visit.     Your Folate was on the lowest end of normal at 7.2, recommend getting OTC supplementation. At least 400mcg.     Thyroid levels and vitamin  b12 looked good.     Cholesterol was improved. Continue Omega 3 supplementation.     Whit Lombardo MD

## 2025-05-04 ENCOUNTER — OFFICE VISIT (OUTPATIENT)
Dept: URGENT CARE | Facility: CLINIC | Age: 60
End: 2025-05-04
Payer: COMMERCIAL

## 2025-05-04 VITALS
TEMPERATURE: 99 F | OXYGEN SATURATION: 98 % | DIASTOLIC BLOOD PRESSURE: 84 MMHG | HEIGHT: 64 IN | RESPIRATION RATE: 18 BRPM | WEIGHT: 200 LBS | HEART RATE: 89 BPM | BODY MASS INDEX: 34.15 KG/M2 | SYSTOLIC BLOOD PRESSURE: 115 MMHG

## 2025-05-04 DIAGNOSIS — R09.81 SINUS CONGESTION: Primary | ICD-10-CM

## 2025-05-04 DIAGNOSIS — J01.90 ACUTE SINUSITIS, RECURRENCE NOT SPECIFIED, UNSPECIFIED LOCATION: ICD-10-CM

## 2025-05-04 LAB
CTP QC/QA: YES
CTP QC/QA: YES
POC MOLECULAR INFLUENZA A AGN: NEGATIVE
POC MOLECULAR INFLUENZA B AGN: NEGATIVE
SARS CORONAVIRUS 2 ANTIGEN: NEGATIVE

## 2025-05-04 PROCEDURE — 99213 OFFICE O/P EST LOW 20 MIN: CPT | Mod: 25,,, | Performed by: PHYSICIAN ASSISTANT

## 2025-05-04 PROCEDURE — 87811 SARS-COV-2 COVID19 W/OPTIC: CPT | Mod: QW,,, | Performed by: PHYSICIAN ASSISTANT

## 2025-05-04 PROCEDURE — 96372 THER/PROPH/DIAG INJ SC/IM: CPT | Mod: ,,, | Performed by: PHYSICIAN ASSISTANT

## 2025-05-04 PROCEDURE — 87502 INFLUENZA DNA AMP PROBE: CPT | Mod: QW,,, | Performed by: PHYSICIAN ASSISTANT

## 2025-05-04 RX ORDER — BETAMETHASONE SODIUM PHOSPHATE AND BETAMETHASONE ACETATE 3; 3 MG/ML; MG/ML
9 INJECTION, SUSPENSION INTRA-ARTICULAR; INTRALESIONAL; INTRAMUSCULAR; SOFT TISSUE
Status: COMPLETED | OUTPATIENT
Start: 2025-05-04 | End: 2025-05-04

## 2025-05-04 RX ORDER — AMOXICILLIN 875 MG/1
875 TABLET, FILM COATED ORAL 2 TIMES DAILY
Qty: 20 TABLET | Refills: 0 | Status: SHIPPED | OUTPATIENT
Start: 2025-05-04 | End: 2025-05-14

## 2025-05-04 RX ORDER — PROMETHAZINE HYDROCHLORIDE AND DEXTROMETHORPHAN HYDROBROMIDE 6.25; 15 MG/5ML; MG/5ML
5 SYRUP ORAL EVERY 8 HOURS PRN
Qty: 118 ML | Refills: 0 | Status: SHIPPED | OUTPATIENT
Start: 2025-05-04 | End: 2025-05-09

## 2025-05-04 RX ADMIN — BETAMETHASONE SODIUM PHOSPHATE AND BETAMETHASONE ACETATE 9 MG: 3; 3 INJECTION, SUSPENSION INTRA-ARTICULAR; INTRALESIONAL; INTRAMUSCULAR; SOFT TISSUE at 10:05

## 2025-05-04 NOTE — PROGRESS NOTES
"Subjective:      Patient ID: Carly Macario is a 59 y.o. female.    Vitals:  height is 5' 4" (1.626 m) and weight is 90.7 kg (200 lb). Her oral temperature is 98.5 °F (36.9 °C). Her blood pressure is 115/84 and her pulse is 89. Her respiration is 18 and oxygen saturation is 98%.     Chief Complaint: Nasal Congestion (Headache, sneezing, nasal pressure and congestion. - Entered by patient) and Sinus Problem    Female reports 1 week ago having nasal sinus congestion sinus pressure sinus headache seen by primary care physician office placed on 5 day Medrol steroid Dosepak, 5 day doxycycline antibiotic coverage and Astelin nasal spray reports having initial relief now in the last 2 days having return in worsening of left sinus congestion pressure with headaches return of coughing in sneezing with pressure radiating to left ear presents to urgent care for re-evaluation.    Sinusitis  This is a new problem. The current episode started in the past 7 days. The problem has been waxing and waning since onset. There has been no fever. Associated symptoms include congestion, headaches and sinus pressure. Pertinent negatives include no ear pain, neck pain, shortness of breath or sore throat.     Constitution: Negative for fever.   HENT:  Positive for congestion, sinus pain and sinus pressure. Negative for ear pain, sore throat, trouble swallowing and voice change.    Neck: Negative for neck pain and neck swelling.   Respiratory:  Negative for shortness of breath.    Gastrointestinal: Negative.    Skin: Negative.  Negative for erythema.   Allergic/Immunologic: Negative.    Neurological:  Positive for headaches.      Objective:     Physical Exam   Constitutional: She is oriented to person, place, and time. She appears well-developed. She is cooperative.  Non-toxic appearance.      Comments:Awake alert ambulatory nasally congested female speaks in complete sentences attended by      HENT:   Head: Normocephalic.   Ears: "   Right Ear: Hearing, tympanic membrane, external ear and ear canal normal.   Left Ear: Hearing, tympanic membrane, external ear and ear canal normal.   Nose: Mucosal edema and congestion present. No rhinorrhea, purulent discharge or nasal deformity. No epistaxis. Right sinus exhibits no maxillary sinus tenderness and no frontal sinus tenderness. Left sinus exhibits maxillary sinus tenderness and frontal sinus tenderness.   Mouth/Throat: Uvula is midline, oropharynx is clear and moist and mucous membranes are normal. Mucous membranes are moist. No trismus in the jaw. Normal dentition. No uvula swelling. No oropharyngeal exudate, posterior oropharyngeal edema or posterior oropharyngeal erythema.      Comments: No edema, no palate petechiae, no muffled voice  Eyes: Conjunctivae and lids are normal. Pupils are equal, round, and reactive to light. No scleral icterus.   Neck: Trachea normal and phonation normal. Neck supple. No edema present. No erythema present. No neck rigidity present.   Cardiovascular: Normal rate, regular rhythm, normal heart sounds and normal pulses.   Pulmonary/Chest: Effort normal and breath sounds normal. No stridor. No respiratory distress. She has no decreased breath sounds. She has no wheezes. She has no rhonchi. She has no rales.         Comments: CTA bilaterally all fields    Musculoskeletal: Normal range of motion.         General: Normal range of motion.      Cervical back: She exhibits no tenderness.   Lymphadenopathy:     She has no cervical adenopathy.   Neurological: no focal deficit. She is alert and oriented to person, place, and time. She displays no weakness. No cranial nerve deficit. She exhibits normal muscle tone. Coordination normal.   Skin: Skin is warm, dry, intact, not diaphoretic, not pale and no rash. No erythema and No lesion   Psychiatric: Her speech is normal and behavior is normal. Judgment and thought content normal.   Nursing note and vitals reviewed.          "Previous History      Review of patient's allergies indicates:  No Known Allergies    Past Medical History:   Diagnosis Date    Anal condyloma     Anxiety     History of depression     Hypertension     Insomnia     Obesity     Sleep apnea     "mild" was told didn't need a machine     Current Outpatient Medications   Medication Instructions    amoxicillin (AMOXIL) 875 mg, Oral, 2 times daily    azelastine (ASTELIN) 137 mcg, Nasal, 2 times daily    AGUEDA 0.05 mg/24 hr 1 patch, Transdermal, Twice weekly    ergocalciferol (ERGOCALCIFEROL) 50,000 Units, Oral, Twice weekly    estradioL (ESTRACE) 0.01 % (0.1 mg/gram) vaginal cream Place vaginally.    lisinopriL 10 mg, Oral, Daily    methylPREDNISolone (MEDROL DOSEPACK) 4 mg tablet use as directed    omega-3 fatty acids/fish oil (FISH OIL-OMEGA-3 FATTY ACIDS) 300-1,000 mg capsule 1 capsule, Daily    promethazine-dextromethorphan (PROMETHAZINE-DM) 6.25-15 mg/5 mL Syrp 5 mLs, Oral, Every 8 hours PRN    QUEtiapine (SEROQUEL) 25 mg, Oral, Nightly     Past Surgical History:   Procedure Laterality Date     SECTION      x2; 1984 & 1986    COLONOSCOPY, WITH HEMORRHOID BANDING      EXCISIONAL HEMORRHOIDECTOMY N/A 2025    Procedure: HEMORRHOIDECTOMY;  Surgeon: Sorin Qiu MD;  Location: [facility];  Service: Colon and Rectal;  Laterality: N/A;  single column    HYSTERECTOMY      Partial    TUBAL LIGATION       Family History   Problem Relation Name Age of Onset    Arthritis Mother Judie Woodwardaneda     Diabetes Mother Judie Ross     Hypertension Mother Judie Woodwardaneda     Diabetes Father Christiano Sanchesa     Heart disease Father Christiano Ross     Heart failure Father Christiano Woodwardaneda     Heart disease Brother Itz Ross     Breast cancer Neg Hx         Social History[1]     Physical Exam      Vital Signs Reviewed   /84   Pulse 89   Temp 98.5 °F (36.9 °C) (Oral)   Resp 18   Ht 5' 4" (1.626 m)   Wt 90.7 kg (200 lb)   SpO2 " 98%   BMI 34.33 kg/m²        Procedures    Procedures     Labs     Results for orders placed or performed in visit on 05/04/25   SARS Coronavirus 2 Antigen, POCT Manual Read    Collection Time: 05/04/25 10:17 AM   Result Value Ref Range    SARS Coronavirus 2 Antigen Negative Negative, Presumptive Negative     Acceptable Yes    POCT Influenza A/B Molecular    Collection Time: 05/04/25 10:17 AM   Result Value Ref Range    POC Molecular Influenza A Ag Negative Negative    POC Molecular Influenza B Ag Negative Negative     Acceptable Yes        Assessment:     1. Sinus congestion    2. Acute sinusitis, recurrence not specified, unspecified location        Plan:   Patient A&O x4 resting comfortably in clinic.  Discussed negative COVID and negative flu testing today though high concern for lingering left-sided nasal sinus congestion, sinusitis and discharge plan with extended Rx amoxicillin 10 course in addition 1 time steroid injection today in addition to continued over-the-counter decongestant antihistamines symptomatic Rx and encouraged PCP or ENT follow-up with strict emergency department precautions.  Patient ready for discharge now    Concern for lingering nasal sinus congestion and sinusitis.  Negative flu, negative COVID specific viral testing today    Recommend amoxicillin antibiotic coverage for sinusitis concern.  Recommend daily non sedating antihistamine Claritin Zyrtec loratadine or lisinopril over the next 1-2 weeks if needed for mild-to-moderate nasal allergies with Benadryl nightly if needed for severe nasal allergies.  Recommend Sudafed or Coricidin decongestant over the next week if needed for nasal sinus congestion pressure and inflammation with 3 day limited Afrin or Jeffrey-Synephrine nasal spray in addition to steroid injection received today.  Recommend Robitussin Delsym Dimetapp or prescription Phenergan DM if needed for cough congestion and rest.  Do not take sedating  medication drive or operate machinery.  Encouraged plenty of water and noncarbonated fluids hydration rest over the next week.  Cover cough at all times.    Recommend follow-up with primary care physician or ENT in 1-2 weeks for re-evaluation if not improving.  Recommended emergency department evaluation immediately respiratory symptoms worsen headache worsens, new symptoms develop.  Sinus congestion  -     SARS Coronavirus 2 Antigen, POCT Manual Read  -     POCT Influenza A/B Molecular    Acute sinusitis, recurrence not specified, unspecified location    Other orders  -     betamethasone acetate-betamethasone sodium phosphate injection 9 mg  -     promethazine-dextromethorphan (PROMETHAZINE-DM) 6.25-15 mg/5 mL Syrp; Take 5 mLs by mouth every 8 (eight) hours as needed (cough).  Dispense: 118 mL; Refill: 0  -     amoxicillin (AMOXIL) 875 MG tablet; Take 1 tablet (875 mg total) by mouth 2 (two) times daily. for 10 days  Dispense: 20 tablet; Refill: 0                         [1]   Social History  Tobacco Use    Smoking status: Former     Current packs/day: 0.00     Average packs/day: 1 pack/day for 18.0 years (18.0 ttl pk-yrs)     Types: Cigarettes     Start date: 1986     Quit date: 2004     Years since quittin.3    Smokeless tobacco: Never    Tobacco comments:     Off and on during time frame   Substance Use Topics    Alcohol use: Not Currently     Comment: occasional    Drug use: Never

## 2025-05-04 NOTE — PATIENT INSTRUCTIONS
Concern for lingering nasal sinus congestion and sinusitis.  Negative flu, negative COVID specific viral testing today    Recommend amoxicillin antibiotic coverage for sinusitis concern.  Recommend daily non sedating antihistamine Claritin Zyrtec loratadine or lisinopril over the next 1-2 weeks if needed for mild-to-moderate nasal allergies with Benadryl nightly if needed for severe nasal allergies.  Recommend Sudafed or Coricidin decongestant over the next week if needed for nasal sinus congestion pressure and inflammation with 3 day limited Afrin or Jeffrey-Synephrine nasal spray in addition to steroid injection received today.  Recommend Robitussin Delsym Dimetapp or prescription Phenergan DM if needed for cough congestion and rest.  Do not take sedating medication drive or operate machinery.  Encouraged plenty of water and noncarbonated fluids hydration rest over the next week.  Cover cough at all times.    Recommend follow-up with primary care physician or ENT in 1-2 weeks for re-evaluation if not improving.  Recommended emergency department evaluation immediately respiratory symptoms worsen headache worsens, new symptoms develop.

## 2025-06-13 DIAGNOSIS — Z79.890 HORMONE REPLACEMENT THERAPY: ICD-10-CM

## 2025-06-13 RX ORDER — ESTRADIOL 0.05 MG/D
PATCH, EXTENDED RELEASE TRANSDERMAL
Qty: 8 PATCH | Refills: 0 | Status: SHIPPED | OUTPATIENT
Start: 2025-06-13

## 2025-06-17 DIAGNOSIS — I10 PRIMARY HYPERTENSION: Primary | Chronic | ICD-10-CM

## 2025-06-17 RX ORDER — LISINOPRIL 10 MG/1
10 TABLET ORAL
Qty: 90 TABLET | Refills: 0 | Status: SHIPPED | OUTPATIENT
Start: 2025-06-17

## 2025-07-07 DIAGNOSIS — Z79.890 HORMONE REPLACEMENT THERAPY: ICD-10-CM

## 2025-07-07 RX ORDER — ESTRADIOL 0.05 MG/D
PATCH, EXTENDED RELEASE TRANSDERMAL
Qty: 8 PATCH | Refills: 0 | Status: SHIPPED | OUTPATIENT
Start: 2025-07-07

## 2025-07-08 ENCOUNTER — TELEPHONE (OUTPATIENT)
Dept: PRIMARY CARE CLINIC | Facility: CLINIC | Age: 60
End: 2025-07-08
Payer: COMMERCIAL

## 2025-07-08 DIAGNOSIS — G47.00 INSOMNIA, UNSPECIFIED TYPE: ICD-10-CM

## 2025-07-08 DIAGNOSIS — F41.1 GAD (GENERALIZED ANXIETY DISORDER): ICD-10-CM

## 2025-07-08 RX ORDER — QUETIAPINE FUMARATE 25 MG/1
25 TABLET, FILM COATED ORAL NIGHTLY
Qty: 30 TABLET | Refills: 11 | Status: SHIPPED | OUTPATIENT
Start: 2025-07-08 | End: 2026-07-08

## 2025-07-31 DIAGNOSIS — Z79.890 HORMONE REPLACEMENT THERAPY: ICD-10-CM

## 2025-07-31 RX ORDER — ESTRADIOL 0.05 MG/D
PATCH, EXTENDED RELEASE TRANSDERMAL
Qty: 8 PATCH | Refills: 0 | Status: SHIPPED | OUTPATIENT
Start: 2025-07-31

## 2025-08-26 DIAGNOSIS — Z79.890 HORMONE REPLACEMENT THERAPY: ICD-10-CM

## 2025-08-26 RX ORDER — ESTRADIOL 0.05 MG/D
PATCH, EXTENDED RELEASE TRANSDERMAL
Qty: 8 PATCH | Refills: 0 | Status: SHIPPED | OUTPATIENT
Start: 2025-08-26

## (undated) DEVICE — NDL HYPO REG 25G X 1 1/2

## (undated) DEVICE — CONTAINER SPECIMEN SCREW 4OZ

## (undated) DEVICE — TAPE SILK 3IN

## (undated) DEVICE — KIT SURGICAL TURNOVER

## (undated) DEVICE — GLOVE PROTEXIS BLUE LATEX 7

## (undated) DEVICE — GLOVE PROTEXIS BLUE LATEX 8

## (undated) DEVICE — Device

## (undated) DEVICE — GLOVE PROTEXIS LTX MICRO  7.5

## (undated) DEVICE — SOL BETADINE 5%

## (undated) DEVICE — GLOVE PROTEXIS LTX MICRO 6.5

## (undated) DEVICE — TRAY SKIN SCRUB WET PREMIUM

## (undated) DEVICE — GAUZE SPONGE 4X4 12PLY

## (undated) DEVICE — ELECTRODE PATIENT RETURN DISP

## (undated) DEVICE — SYR 10CC LUER LOCK

## (undated) DEVICE — TUBING NEPTUNE 2 SMOKE 10IN